# Patient Record
Sex: FEMALE | URBAN - METROPOLITAN AREA
[De-identification: names, ages, dates, MRNs, and addresses within clinical notes are randomized per-mention and may not be internally consistent; named-entity substitution may affect disease eponyms.]

---

## 2024-05-31 ENCOUNTER — OFFICE VISIT (OUTPATIENT)
Dept: OBGYN CLINIC | Age: 24
End: 2024-05-31
Payer: COMMERCIAL

## 2024-05-31 ENCOUNTER — HOSPITAL ENCOUNTER (OUTPATIENT)
Age: 24
Setting detail: SPECIMEN
Discharge: HOME | End: 2024-05-31

## 2024-05-31 VITALS
WEIGHT: 114 LBS | SYSTOLIC BLOOD PRESSURE: 112 MMHG | HEIGHT: 63 IN | BODY MASS INDEX: 20.2 KG/M2 | DIASTOLIC BLOOD PRESSURE: 78 MMHG

## 2024-05-31 DIAGNOSIS — Z11.3 SCREEN FOR STD (SEXUALLY TRANSMITTED DISEASE): ICD-10-CM

## 2024-05-31 DIAGNOSIS — Z01.419 WELL WOMAN EXAM: Primary | ICD-10-CM

## 2024-05-31 DIAGNOSIS — T83.32XA INTRAUTERINE CONTRACEPTIVE DEVICE THREADS LOST, INITIAL ENCOUNTER: ICD-10-CM

## 2024-05-31 DIAGNOSIS — R10.2 PELVIC PAIN: ICD-10-CM

## 2024-05-31 PROCEDURE — 99385 PREV VISIT NEW AGE 18-39: CPT | Performed by: ADVANCED PRACTICE MIDWIFE

## 2024-05-31 RX ORDER — DEXTROAMPHETAMINE/AMPHETAMINE 15 MG
CAPSULE, EXT RELEASE 24 HR ORAL
COMMUNITY

## 2024-05-31 RX ORDER — FLUOXETINE HCL 20 MG
CAPSULE ORAL
COMMUNITY
Start: 2024-04-03

## 2024-05-31 RX ORDER — DEXTROAMPHETAMINE SACCHARATE, AMPHETAMINE ASPARTATE, DEXTROAMPHETAMINE SULFATE, AND AMPHETAMINE SULFATE 1.25; 1.25; 1.25; 1.25 MG/1; MG/1; MG/1; MG/1
TABLET ORAL
COMMUNITY

## 2024-05-31 SDOH — ECONOMIC STABILITY: FOOD INSECURITY: WITHIN THE PAST 12 MONTHS, THE FOOD YOU BOUGHT JUST DIDN'T LAST AND YOU DIDN'T HAVE MONEY TO GET MORE.: NEVER TRUE

## 2024-05-31 SDOH — ECONOMIC STABILITY: HOUSING INSECURITY
IN THE LAST 12 MONTHS, WAS THERE A TIME WHEN YOU DID NOT HAVE A STEADY PLACE TO SLEEP OR SLEPT IN A SHELTER (INCLUDING NOW)?: NO

## 2024-05-31 SDOH — ECONOMIC STABILITY: FOOD INSECURITY: WITHIN THE PAST 12 MONTHS, YOU WORRIED THAT YOUR FOOD WOULD RUN OUT BEFORE YOU GOT MONEY TO BUY MORE.: NEVER TRUE

## 2024-05-31 SDOH — ECONOMIC STABILITY: INCOME INSECURITY: HOW HARD IS IT FOR YOU TO PAY FOR THE VERY BASICS LIKE FOOD, HOUSING, MEDICAL CARE, AND HEATING?: NOT HARD AT ALL

## 2024-05-31 ASSESSMENT — ENCOUNTER SYMPTOMS
NAUSEA: 0
SHORTNESS OF BREATH: 0
DIARRHEA: 0
VOMITING: 0
ABDOMINAL PAIN: 0

## 2024-05-31 ASSESSMENT — PATIENT HEALTH QUESTIONNAIRE - PHQ9: DEPRESSION UNABLE TO ASSESS: PT REFUSES

## 2024-05-31 NOTE — PROGRESS NOTES
Chaperone for Intimate Exam  Chaperone was offered as part of the rooming process. Patient declined and agrees to continue with exam without a chaperone.  Chaperone: n/a      
no  Domestic Violence Screening: negative    STD history: No      Birth control method :Yes    If yes, has a Mirena IUD      Physical Exam  Constitutional:       Appearance: Normal appearance. She is not diaphoretic.   Genitourinary:      No lesions in the vagina.      Right Labia: No rash, tenderness, lesions or skin changes.     Left Labia: No tenderness, lesions, skin changes or rash.     No vaginal erythema, tenderness or ulceration.        Right Adnexa: not tender.     Left Adnexa: not tender.     No cervical motion tenderness or friability.      No IUD strings visualized.   Breasts:     Right: No inverted nipple, mass, nipple discharge, skin change or tenderness.      Left: No inverted nipple, mass, nipple discharge, skin change or tenderness.   Cardiovascular:      Rate and Rhythm: Normal rate and regular rhythm.   Pulmonary:      Effort: Pulmonary effort is normal.   Abdominal:      General: Abdomen is flat. Bowel sounds are normal.   Musculoskeletal:         General: Normal range of motion.      Cervical back: Normal range of motion.   Neurological:      Mental Status: She is alert.   Psychiatric:         Mood and Affect: Mood normal.         Thought Content: Thought content normal.         Judgment: Judgment normal.   Vitals reviewed.           Assessment & Plan  Well Woman Exam  Age 18 and > Well Woman Care  General Health:  [x] Reviewed the importance of routine health screening based on age and risk factors: Yes  Azalea Meyers has a PCP No None, None  If no: PCP referral sent: yes  [x] Alcohol screening & counseling  none  [x] Blood pressure screening: normal   History of Hypertension: no  [x] Contraceptive counseling & methods: well woman has a Mirena IUD   Happy with current method: Yes and NO  [x] Diabetes Screening based on age and risk factors discussed: yes   [x] Folic acid supplementation Reviewed recommended during the childbearing years  [x] Healthful diet & activity counseling:  discussed  [x]

## 2024-06-01 DIAGNOSIS — Z01.419 WELL WOMAN EXAM: ICD-10-CM

## 2024-06-01 DIAGNOSIS — Z11.3 SCREEN FOR STD (SEXUALLY TRANSMITTED DISEASE): ICD-10-CM

## 2024-06-01 LAB
C TRACH DNA SPEC QL PROBE+SIG AMP: NORMAL
N GONORRHOEA DNA SPEC QL PROBE+SIG AMP: NORMAL
SPECIMEN DESCRIPTION: NORMAL

## 2024-06-03 ENCOUNTER — HOSPITAL ENCOUNTER (OUTPATIENT)
Dept: PHYSICAL THERAPY | Facility: CLINIC | Age: 24
Setting detail: THERAPIES SERIES
Discharge: HOME | End: 2024-06-03
Payer: COMMERCIAL

## 2024-06-03 ENCOUNTER — APPOINTMENT (OUTPATIENT)
Dept: ULTRASOUND IMAGING | Age: 24
End: 2024-06-03
Payer: COMMERCIAL

## 2024-06-03 ENCOUNTER — HOSPITAL ENCOUNTER (OUTPATIENT)
Dept: ULTRASOUND IMAGING | Age: 24
Discharge: HOME | End: 2024-06-05
Payer: COMMERCIAL

## 2024-06-03 DIAGNOSIS — T83.32XA INTRAUTERINE CONTRACEPTIVE DEVICE THREADS LOST, INITIAL ENCOUNTER: ICD-10-CM

## 2024-06-03 PROCEDURE — 97161 PT EVAL LOW COMPLEX 20 MIN: CPT

## 2024-06-03 PROCEDURE — 76856 US EXAM PELVIC COMPLETE: CPT

## 2024-06-03 PROCEDURE — 97530 THERAPEUTIC ACTIVITIES: CPT

## 2024-06-03 PROCEDURE — 76830 TRANSVAGINAL US NON-OB: CPT

## 2024-06-03 NOTE — CONSULTS
[] [facility]  Outpatient Rehabilitation &  Therapy  [address]  [phone]  F: [phone] [] [facility]  Outpatient Rehabilitation &  Therapy  [address]   [address]  P: [phone]  F: [phone] [x] [facility]  Outpatient Rehabilitation &  Therapy  [address]  P: [phone]  F: [phone] [] [facility]  Outpatient Rehabilitation &  Therapy  [address]  P: [phone]  F: [phone] [] [facility]  Outpatient Rehabilitation &  Therapy  [address]   P: [phone]  F: [phone]      Physical Therapy General Evaluation/Pelvic Floor     Date:  6/3/2024  Patient: Azalea Meyers  : 2000  MRN: [id number]  Physician: WOLF Lyles    Insurance:  [place] PPO: tracee  -  vs remain, hard max, combined; No auth req'd; OOP met - covered 100%    Medical Diagnosis:   Z01.419 (ICD-10-CM) - Well woman exam   R10.2 (ICD-10-CM) - Pelvic pain     Rehab Codes: R10.2, R27.8, M62.81, M62.08, R39.15, N39.41   Onset Date:  3/20/2022                       Next 's appt: 24    Subjective:   CC/HPI: Patient reports to physical therapy with pelvic pain, urinary urgency and UUI. Patient reports that she delivered vaginally on 3/20/2022 and began to experience pain following. States that she has primarily feels pain with sexual intercourse and when her bladder is full. Pain occurs primarily in her abdominal region and lower back. Describes the pain as sharp, however has a constant feeling of pressure in her lower abdominal region. Patient has also noticed urinary urgency and feels that she is not completely emptying her bladder. Does have urinary urge incontinence as well, which primarily occurs because she has held her bladder for a longer period of time.       Stress Incontinence: [] Yes   [x] No      Stress incontinence occurs with:   Amount

## 2024-06-04 LAB
C TRACH DNA SPEC QL PROBE+SIG AMP: NEGATIVE
N GONORRHOEA DNA SPEC QL PROBE+SIG AMP: NEGATIVE
SPECIMEN DESCRIPTION: NORMAL

## 2024-06-09 LAB — CYTOLOGY REPORT: NORMAL

## 2024-06-12 ENCOUNTER — HOSPITAL ENCOUNTER (OUTPATIENT)
Dept: PHYSICAL THERAPY | Facility: CLINIC | Age: 24
Setting detail: THERAPIES SERIES
Discharge: HOME | End: 2024-06-12
Payer: COMMERCIAL

## 2024-06-12 PROCEDURE — 97140 MANUAL THERAPY 1/> REGIONS: CPT

## 2024-06-12 PROCEDURE — 97110 THERAPEUTIC EXERCISES: CPT

## 2024-06-12 NOTE — FLOWSHEET NOTE
[] [facility]  Outpatient Rehabilitation &  Therapy  [address]  [phone]  [phone] [] [facility]  Outpatient Rehabilitation &  Therapy  [address]  P: [phone]  F: [phone] [x] [facility]  Outpatient Rehabilitation &  Therapy  [address]  P: [phone]  F: [phone] [] [facility]  Outpatient Rehabilitation &  Therapy  [address]  [phone]  [phone] [] [facility]  Outpatient Rehabilitation &  Therapy  [address]   P: [phone]  F: [phone]  [] [facility]  Outpatient Rehabilitation &  Therapy  [address]  P: [phone]  F: [phone] [] [facility]  Outpatient Rehabilitation &  Therapy  [address]  P: [phone]  F: [phone] [] [facility]  Outpatient Rehabilitation &  Therapy  [address]  P: [phone]  F: [phone] [] [facility]  Outpatient Rehabilitation &  Therapy  [address]  P: [phone]  F: [phone]  [] [facility] Outpatient Rehabilitation &  Therapy  [address]  P: [phone]  F: [phone]     Physical Therapy Daily Treatment Note    Date:  2024  Patient Name:  Azalea Meyers    :  2000  MRN: [id number]  Physician: WOLF Lyles                   Insurance:  [place] PPO: tracee  -  60 vs remain, hard max, combined; No auth req'd; OOP met - covered 100%    Medical Diagnosis:   Z01.419 (ICD-10-CM) - Well woman exam   R10.2 (ICD-10-CM) - Pelvic pain      Rehab Codes: R10.2, R27.8, M62.81, M62.08, R39.15, N39.41   Onset Date:  3/20/2022                       Next 's appt: 24  Visit# / total visits: ;     Cancels/No Shows: 0/0    Subjective:    Pain:  [x] Yes  [] No Location: (R) low

## 2024-06-19 ENCOUNTER — HOSPITAL ENCOUNTER (OUTPATIENT)
Dept: PHYSICAL THERAPY | Facility: CLINIC | Age: 24
Setting detail: THERAPIES SERIES
Discharge: HOME | End: 2024-06-19
Payer: COMMERCIAL

## 2024-06-19 PROCEDURE — 97110 THERAPEUTIC EXERCISES: CPT

## 2024-06-19 PROCEDURE — 97140 MANUAL THERAPY 1/> REGIONS: CPT

## 2024-06-19 NOTE — FLOWSHEET NOTE
Figure 4 Piriformis Stretch  - 1 x daily - 7 x weekly - 3 sets - 30 sec hold  - Supine Lower Trunk Rotation  - 1 x daily - 7 x weekly - 1 sets - 10 reps  - Half Kneeling Hip Flexor Stretch  - 1 x daily - 7 x weekly - 3 sets - 30 sec hold  - Supine Bridge  - 2 x daily - 7 x weekly - 2 sets - 10 reps  - Clamshell  - 2 x daily - 7 x weekly - 2 sets - 10 reps  - Sidelying Hip Abduction  - 2 x daily - 7 x weekly - 2 sets - 10 reps  - Supine Hip Adduction Isometric with Ball  - 1 x daily - 7 x weekly - 3 sets - 10 reps - 3 sec hold  - Supine Transversus Abdominis Bracing - Hands on Stomach  - 2 x daily - 7 x weekly - 2 sets - 10 reps - 5 sec hold  Comprehension of Education:  [x] Verbalizes understanding.  [x] Demonstrates understanding.  [x] Needs review.  [] Demonstrates/verbalizes HEP/Ed previously given.     Plan: [x] Continue current frequency toward long and short term goals.          Time In: 3:35 pm            Time Out: 4:21 pm    Electronically signed by:  Porsha Morales, PT

## 2024-06-22 SDOH — HEALTH STABILITY: PHYSICAL HEALTH: ON AVERAGE, HOW MANY DAYS PER WEEK DO YOU ENGAGE IN MODERATE TO STRENUOUS EXERCISE (LIKE A BRISK WALK)?: 7 DAYS

## 2024-06-25 ENCOUNTER — HOSPITAL ENCOUNTER (OUTPATIENT)
Dept: PHYSICAL THERAPY | Facility: CLINIC | Age: 24
Setting detail: THERAPIES SERIES
Discharge: HOME | End: 2024-06-25
Payer: COMMERCIAL

## 2024-06-25 ENCOUNTER — OFFICE VISIT (OUTPATIENT)
Dept: FAMILY MEDICINE CLINIC | Age: 24
End: 2024-06-25
Payer: COMMERCIAL

## 2024-06-25 VITALS
WEIGHT: 112 LBS | OXYGEN SATURATION: 99 % | SYSTOLIC BLOOD PRESSURE: 108 MMHG | DIASTOLIC BLOOD PRESSURE: 70 MMHG | HEIGHT: 65 IN | BODY MASS INDEX: 18.66 KG/M2 | HEART RATE: 81 BPM

## 2024-06-25 DIAGNOSIS — F90.9 ATTENTION DEFICIT HYPERACTIVITY DISORDER (ADHD), UNSPECIFIED ADHD TYPE: ICD-10-CM

## 2024-06-25 DIAGNOSIS — K58.2 IRRITABLE BOWEL SYNDROME WITH BOTH CONSTIPATION AND DIARRHEA: ICD-10-CM

## 2024-06-25 DIAGNOSIS — F32.A ANXIETY AND DEPRESSION: ICD-10-CM

## 2024-06-25 DIAGNOSIS — F41.9 ANXIETY AND DEPRESSION: ICD-10-CM

## 2024-06-25 DIAGNOSIS — Z00.00 PREVENTATIVE HEALTH CARE: ICD-10-CM

## 2024-06-25 DIAGNOSIS — Z76.89 ENCOUNTER TO ESTABLISH CARE: ICD-10-CM

## 2024-06-25 DIAGNOSIS — M79.10 MYALGIA: ICD-10-CM

## 2024-06-25 DIAGNOSIS — M25.50 ARTHRALGIA, UNSPECIFIED JOINT: Primary | ICD-10-CM

## 2024-06-25 DIAGNOSIS — R63.4 WEIGHT LOSS: ICD-10-CM

## 2024-06-25 DIAGNOSIS — R53.83 FATIGUE, UNSPECIFIED TYPE: ICD-10-CM

## 2024-06-25 PROCEDURE — 97110 THERAPEUTIC EXERCISES: CPT

## 2024-06-25 PROCEDURE — 99204 OFFICE O/P NEW MOD 45 MIN: CPT | Performed by: NURSE PRACTITIONER

## 2024-06-25 ASSESSMENT — ENCOUNTER SYMPTOMS
BACK PAIN: 0
CHEST TIGHTNESS: 0
COUGH: 0
ABDOMINAL PAIN: 0
CONSTIPATION: 0
RHINORRHEA: 0
SHORTNESS OF BREATH: 0
ABDOMINAL DISTENTION: 0
DIARRHEA: 0
SORE THROAT: 0
VOMITING: 0
NAUSEA: 0

## 2024-06-25 ASSESSMENT — PATIENT HEALTH QUESTIONNAIRE - PHQ9
8. MOVING OR SPEAKING SO SLOWLY THAT OTHER PEOPLE COULD HAVE NOTICED. OR THE OPPOSITE, BEING SO FIGETY OR RESTLESS THAT YOU HAVE BEEN MOVING AROUND A LOT MORE THAN USUAL: NOT AT ALL
6. FEELING BAD ABOUT YOURSELF - OR THAT YOU ARE A FAILURE OR HAVE LET YOURSELF OR YOUR FAMILY DOWN: NEARLY EVERY DAY
SUM OF ALL RESPONSES TO PHQ QUESTIONS 1-9: 19
7. TROUBLE CONCENTRATING ON THINGS, SUCH AS READING THE NEWSPAPER OR WATCHING TELEVISION: NEARLY EVERY DAY
3. TROUBLE FALLING OR STAYING ASLEEP: NEARLY EVERY DAY
SUM OF ALL RESPONSES TO PHQ QUESTIONS 1-9: 19
5. POOR APPETITE OR OVEREATING: NEARLY EVERY DAY
2. FEELING DOWN, DEPRESSED OR HOPELESS: MORE THAN HALF THE DAYS
4. FEELING TIRED OR HAVING LITTLE ENERGY: NEARLY EVERY DAY
SUM OF ALL RESPONSES TO PHQ9 QUESTIONS 1 & 2: 4
10. IF YOU CHECKED OFF ANY PROBLEMS, HOW DIFFICULT HAVE THESE PROBLEMS MADE IT FOR YOU TO DO YOUR WORK, TAKE CARE OF THINGS AT HOME, OR GET ALONG WITH OTHER PEOPLE: VERY DIFFICULT
1. LITTLE INTEREST OR PLEASURE IN DOING THINGS: MORE THAN HALF THE DAYS
SUM OF ALL RESPONSES TO PHQ QUESTIONS 1-9: 19
9. THOUGHTS THAT YOU WOULD BE BETTER OFF DEAD, OR OF HURTING YOURSELF: NOT AT ALL
SUM OF ALL RESPONSES TO PHQ QUESTIONS 1-9: 19

## 2024-06-25 NOTE — FLOWSHEET NOTE
hold  - Quick Flick Pelvic Floor Contractions in Hooklying  - 1 x daily - 7 x weekly - 1 sets - 15 reps  - Pelvic Floor Contractions in Hooklying with Adduction  - 1 x daily - 7 x weekly - 1 sets - 15 reps - 3 sec hold  - Bent Knee Fallouts  - 1 x daily - 7 x weekly - 2 sets - 10 reps  - Supine March  - 1 x daily - 7 x weekly - 2 sets - 10 reps  - Hooklying Heel Walk  - 1 x daily - 7 x weekly - 1 sets - 10 reps  - Active Straight Leg Raise Advanced  - 1 x daily - 7 x weekly - 1 sets - 10 reps  - Standing Hip Flexion with Resistance Loop  - 1 x daily - 7 x weekly - 1 sets - 10 reps  - Hip Abduction with Resistance Loop  - 1 x daily - 7 x weekly - 1 sets - 10 reps  - Hip Extension with Resistance Loop  - 1 x daily - 7 x weekly - 1 sets - 10 reps  Comprehension of Education:  [x] Verbalizes understanding.  [x] Demonstrates understanding.  [x] Needs review.  [] Demonstrates/verbalizes HEP/Ed previously given.     Plan: [x] Continue current frequency toward long and short term goals.          Time In: 3:10 pm            Time Out: 4:00 pm    Electronically signed by:  Porsha Morales PT

## 2024-06-25 NOTE — PROGRESS NOTES
sounds: Normal heart sounds. No murmur heard.  Pulmonary:      Effort: Pulmonary effort is normal. No respiratory distress.      Breath sounds: Normal breath sounds.   Chest:      Chest wall: No tenderness.   Abdominal:      General: Bowel sounds are normal.      Palpations: Abdomen is soft.      Tenderness: There is no abdominal tenderness.   Musculoskeletal:         General: Normal range of motion.      Cervical back: Normal range of motion.      Right lower leg: No edema.      Left lower leg: No edema.   Skin:     General: Skin is warm and dry.      Findings: No rash.   Neurological:      Mental Status: She is alert and oriented to person, place, and time.   Psychiatric:         Mood and Affect: Mood normal.         Behavior: Behavior is cooperative.       ASSESSMENT:   Diagnosis Orders   1. Arthralgia, unspecified joint  STEF Screen with Reflex    Cyclic Citrul Peptide Antibody, IgG    Rheumatoid Factor    Sedimentation Rate      2. Encounter to establish care        3. Preventative health care  CBC    Comprehensive Metabolic Panel    Hemoglobin A1C    Lipid Panel    TSH with Reflex      4. Myalgia  STEF Screen with Reflex    Cyclic Citrul Peptide Antibody, IgG    Rheumatoid Factor    Sedimentation Rate      5. Fatigue, unspecified type  Iron and TIBC    Vitamin B12 & Folate    Celiac Disease Panel    STEF Screen with Reflex    Cyclic Citrul Peptide Antibody, IgG    Rheumatoid Factor    Sedimentation Rate      6. Weight loss        7. Irritable bowel syndrome with both constipation and diarrhea  IBD Panel    Celiac Disease Panel      8. Anxiety and depression        9. Attention deficit hyperactivity disorder (ADHD), unspecified ADHD type           PLAN:  1. Encounter to establish care  - Previous patient of Dr. Cydney Arellano, pediatrician    - Last documented encounter was 10/2022      2. Preventative health care  - We did discuss in detail the and the recommended preventative screening guidelines (HTN, HLD, DM,

## 2024-07-02 ENCOUNTER — HOSPITAL ENCOUNTER (OUTPATIENT)
Dept: PHYSICAL THERAPY | Facility: CLINIC | Age: 24
Setting detail: THERAPIES SERIES
Discharge: HOME | End: 2024-07-02
Payer: COMMERCIAL

## 2024-07-02 PROCEDURE — 97110 THERAPEUTIC EXERCISES: CPT

## 2024-07-02 NOTE — FLOWSHEET NOTE
[] [facility]  Outpatient Rehabilitation &  Therapy  [address]  [phone]  [phone] [] [facility]  Outpatient Rehabilitation &  Therapy  [address]  P: [phone]  F: [phone] [x] [facility]  Outpatient Rehabilitation &  Therapy  [address]  P: [phone]  F: [phone] [] [facility]  Outpatient Rehabilitation &  Therapy  [address]  [phone]  [phone] [] [facility]  Outpatient Rehabilitation &  Therapy  [address]   P: [phone]  F: [phone]  [] [facility]  Outpatient Rehabilitation &  Therapy  [address]  P: [phone]  F: [phone] [] [facility]  Outpatient Rehabilitation &  Therapy  [address]  P: [phone]  F: [phone] [] [facility]  Outpatient Rehabilitation &  Therapy  [address]  P: [phone]  F: [phone] [] [facility]  Outpatient Rehabilitation &  Therapy  [address]  P: [phone]  F: [phone]  [] [facility] Outpatient Rehabilitation &  Therapy  [address]  P: [phone]  F: [phone]     Physical Therapy Daily Treatment Note    Date:  2024  Patient Name:  Azalea Meyers    :  2000  MRN: [id number]  Physician: WOLF Lyles                   Insurance:  [place] PPO: tracee  -  60 vs remain, hard max, combined; No auth req'd; OOP met - covered 100%    Medical Diagnosis:   Z01.419 (ICD-10-CM) - Well woman exam   R10.2 (ICD-10-CM) - Pelvic pain      Rehab Codes: R10.2, R27.8, M62.81, M62.08, R39.15, N39.41   Onset Date:  3/20/2022                       Next 's appt: 24  Visit# / total visits: ;     Cancels/No Shows: 0/0    Subjective:    Pain:  [x] Yes  [] No Location: (R) low

## 2024-07-09 ENCOUNTER — HOSPITAL ENCOUNTER (OUTPATIENT)
Dept: PHYSICAL THERAPY | Facility: CLINIC | Age: 24
Setting detail: THERAPIES SERIES
Discharge: HOME | End: 2024-07-09
Payer: COMMERCIAL

## 2024-07-09 PROCEDURE — 97110 THERAPEUTIC EXERCISES: CPT

## 2024-07-09 NOTE — FLOWSHEET NOTE
[] [facility]  Outpatient Rehabilitation &  Therapy  [address]  [phone]  [phone] [] [facility]  Outpatient Rehabilitation &  Therapy  [address]  P: [phone]  F: [phone] [x] [facility]  Outpatient Rehabilitation &  Therapy  [address]  P: [phone]  F: [phone] [] [facility]  Outpatient Rehabilitation &  Therapy  [address]  [phone]  [phone] [] [facility]  Outpatient Rehabilitation &  Therapy  [address]   P: [phone]  F: [phone]  [] [facility]  Outpatient Rehabilitation &  Therapy  [address]  P: [phone]  F: [phone] [] [facility]  Outpatient Rehabilitation &  Therapy  [address]  P: [phone]  F: [phone] [] [facility]  Outpatient Rehabilitation &  Therapy  [address]  P: [phone]  F: [phone] [] [facility]  Outpatient Rehabilitation &  Therapy  [address]  P: [phone]  F: [phone]  [] [facility] Outpatient Rehabilitation &  Therapy  [address]  P: [phone]  F: [phone]     Physical Therapy Daily Treatment Note    Date:  2024  Patient Name:  Azalea Meyers    :  2000  MRN: [id number]  Physician: WOLF Lyles                   Insurance:  [place] PPO: tracee  -  60 vs remain, hard max, combined; No auth req'd; OOP met - covered 100%    Medical Diagnosis:   Z01.419 (ICD-10-CM) - Well woman exam   R10.2 (ICD-10-CM) - Pelvic pain      Rehab Codes: R10.2, R27.8, M62.81, M62.08, R39.15, N39.41   Onset Date:  3/20/2022                       Next 's appt: 24  Visit# / total visits: ;     Cancels/No Shows: 0/0    Subjective:    Pain:  [x] Yes  [] No Location: (R) low

## 2024-07-16 ENCOUNTER — HOSPITAL ENCOUNTER (OUTPATIENT)
Dept: PHYSICAL THERAPY | Facility: CLINIC | Age: 24
Setting detail: THERAPIES SERIES
Discharge: HOME | End: 2024-07-16
Payer: COMMERCIAL

## 2024-07-16 PROCEDURE — 97110 THERAPEUTIC EXERCISES: CPT

## 2024-07-16 PROCEDURE — 97140 MANUAL THERAPY 1/> REGIONS: CPT

## 2024-07-16 NOTE — FLOWSHEET NOTE
[] [facility]  Outpatient Rehabilitation &  Therapy  [address]  [phone]  [phone] [] [facility]  Outpatient Rehabilitation &  Therapy  [address]  P: [phone]  F: [phone] [x] [facility]  Outpatient Rehabilitation &  Therapy  [address]  P: [phone]  F: [phone] [] [facility]  Outpatient Rehabilitation &  Therapy  [address]  [phone]  [phone] [] [facility]  Outpatient Rehabilitation &  Therapy  [address]   P: [phone]  F: [phone]  [] [facility]  Outpatient Rehabilitation &  Therapy  [address]  P: [phone]  F: [phone] [] [facility]  Outpatient Rehabilitation &  Therapy  [address]  P: [phone]  F: [phone] [] [facility]  Outpatient Rehabilitation &  Therapy  [address]  P: [phone]  F: [phone] [] [facility]  Outpatient Rehabilitation &  Therapy  [address]  P: [phone]  F: [phone]  [] [facility] Outpatient Rehabilitation &  Therapy  [address]  P: [phone]  F: [phone]     Physical Therapy Daily Treatment Note    Date:  2024  Patient Name:  Azalea Meyers    :  2000  MRN: [id number]  Physician: WOLF Lyles                   Insurance:  [place] PPO: tracee  -  60 vs remain, hard max, combined; No auth req'd; OOP met - covered 100%    Medical Diagnosis:   Z01.419 (ICD-10-CM) - Well woman exam   R10.2 (ICD-10-CM) - Pelvic pain      Rehab Codes: R10.2, R27.8, M62.81, M62.08, R39.15, N39.41   Onset Date:  3/20/2022                       Next 's appt: 24  Visit# / total visits: ;     Cancels/No Shows: 0/0    Subjective:    Pain:  [x] Yes  [] No Location: (R) low

## 2024-07-23 ENCOUNTER — HOSPITAL ENCOUNTER (OUTPATIENT)
Dept: PHYSICAL THERAPY | Facility: CLINIC | Age: 24
Setting detail: THERAPIES SERIES
Discharge: HOME | End: 2024-07-23
Payer: COMMERCIAL

## 2024-07-23 PROCEDURE — 97140 MANUAL THERAPY 1/> REGIONS: CPT

## 2024-07-23 PROCEDURE — 97110 THERAPEUTIC EXERCISES: CPT

## 2024-07-23 NOTE — FLOWSHEET NOTE
[] [facility]  Outpatient Rehabilitation &  Therapy  [address]  [phone]  [phone] [] [facility]  Outpatient Rehabilitation &  Therapy  [address]  P: [phone]  F: [phone] [x] [facility]  Outpatient Rehabilitation &  Therapy  [address]  P: [phone]  F: [phone] [] [facility]  Outpatient Rehabilitation &  Therapy  [address]  [phone]  [phone] [] [facility]  Outpatient Rehabilitation &  Therapy  [address]   P: [phone]  F: [phone]  [] [facility]  Outpatient Rehabilitation &  Therapy  [address]  P: [phone]  F: [phone] [] [facility]  Outpatient Rehabilitation &  Therapy  [address]  P: [phone]  F: [phone] [] [facility]  Outpatient Rehabilitation &  Therapy  [address]  P: [phone]  F: [phone] [] [facility]  Outpatient Rehabilitation &  Therapy  [address]  P: [phone]  F: [phone]  [] [facility] Outpatient Rehabilitation &  Therapy  [address]  P: [phone]  F: [phone]     Physical Therapy Daily Treatment Note    Date:  2024  Patient Name:  Azalea Meyers    :  2000  MRN: [id number]  Physician: WOLF Lyles                   Insurance:  [place] PPO: tracee  -  60 vs remain, hard max, combined; No auth req'd; OOP met - covered 100%    Medical Diagnosis:   Z01.419 (ICD-10-CM) - Well woman exam   R10.2 (ICD-10-CM) - Pelvic pain      Rehab Codes: R10.2, R27.8, M62.81, M62.08, R39.15, N39.41   Onset Date:  3/20/2022                       Next 's appt: 24  Visit# / total visits: ;     Cancels/No Shows: 0/0    Subjective:    Pain:  [x] Yes  [] No Location: (L)  Pain

## 2024-07-30 ENCOUNTER — HOSPITAL ENCOUNTER (OUTPATIENT)
Dept: PHYSICAL THERAPY | Facility: CLINIC | Age: 24
Setting detail: THERAPIES SERIES
Discharge: HOME | End: 2024-07-30
Payer: COMMERCIAL

## 2024-07-30 PROCEDURE — 97140 MANUAL THERAPY 1/> REGIONS: CPT

## 2024-07-30 PROCEDURE — 97110 THERAPEUTIC EXERCISES: CPT

## 2024-07-30 NOTE — FLOWSHEET NOTE
with good tolerance. Increased resistance of TB rows and shoulder extension with patient noting more difficulty, however able to tolerate. Initiated TB ER/IR in order to progress scapular stability. Resumed mat hip strengthening ex in order to progress stability to low back and pelvis. Ended session with IASTM to (L) lumbar paraspinals and (L) piriformis in order to reduce pain and discomfort. Patient reports reduced pain following session this date.     [] No change.     [] Other:  [x] Patient would continue to benefit from skilled physical therapy services in order to: improve pain tolerance,diastasis recti, pelvic floor relaxation, pelvic floor coordination and (B) LE strength.     STG: (to be met in 6 treatments)  Patient will report pain as 2/10  ? Function: Patient will demonstrate 1 cm perineal body descent without use of accessory muscles in order to improve relaxation and coordination of the pelvic floor  Patient will demonstrate proper toileting posture and breathing technique in order to reduce constipation   Patient will report reduced UUI symptoms with use of urge suppression techniques   Independent with Home Exercise Programs  LTG: (to be met in 12 treatments)   Patient will improve PPIQ to <10 points in order to indicate improved overall quality of life   Patient will improve diastasis recti to 0 finger widths in order to reduce tension on low back and decrease low back pain   Patient will demonstrate ability to lift up to 25lbs with good core/pelvic floor stability and good breathing technique in order to increase ease of performing care taking tasks   Patient will improve (B) LE strength to 5/5 in order to increase stability to pelvis   Patient will report pain as 0/10    Pt. Education:  [x] Yes  [] No  [x] Reviewed Prior HEP/Ed  Method of Education: [x] Verbal  [x] Demo  [] Written  Access Code: [email or web address]  URL: [email or web address]  Date: 07/02/2024  Prepared by: Porsha

## 2024-07-31 ENCOUNTER — HOSPITAL ENCOUNTER (OUTPATIENT)
Age: 24
Setting detail: SPECIMEN
Discharge: HOME | End: 2024-07-31

## 2024-07-31 DIAGNOSIS — K58.2 IRRITABLE BOWEL SYNDROME WITH BOTH CONSTIPATION AND DIARRHEA: ICD-10-CM

## 2024-07-31 DIAGNOSIS — R53.83 FATIGUE, UNSPECIFIED TYPE: ICD-10-CM

## 2024-07-31 DIAGNOSIS — M25.50 ARTHRALGIA, UNSPECIFIED JOINT: ICD-10-CM

## 2024-07-31 DIAGNOSIS — Z00.00 PREVENTATIVE HEALTH CARE: ICD-10-CM

## 2024-07-31 DIAGNOSIS — M79.10 MYALGIA: ICD-10-CM

## 2024-07-31 LAB
ALBUMIN SERPL-MCNC: 4.4 G/DL (ref 3.5–5.2)
ALBUMIN/GLOB SERPL: 2 {RATIO} (ref 1–2.5)
ALP SERPL-CCNC: 61 U/L (ref 35–104)
ALT SERPL-CCNC: 15 U/L (ref 10–35)
ANION GAP SERPL CALCULATED.3IONS-SCNC: 13 MMOL/L (ref 9–16)
AST SERPL-CCNC: 20 U/L (ref 10–35)
BILIRUB SERPL-MCNC: 0.3 MG/DL (ref 0–1.2)
BUN SERPL-MCNC: 14 MG/DL (ref 6–20)
CALCIUM SERPL-MCNC: 9.1 MG/DL (ref 8.6–10.4)
CHLORIDE SERPL-SCNC: 103 MMOL/L (ref 98–107)
CHOLEST SERPL-MCNC: 163 MG/DL (ref 0–199)
CHOLESTEROL/HDL RATIO: 3
CO2 SERPL-SCNC: 22 MMOL/L (ref 20–31)
CREAT SERPL-MCNC: 0.8 MG/DL (ref 0.5–0.9)
ERYTHROCYTE [DISTWIDTH] IN BLOOD BY AUTOMATED COUNT: 12.3 % (ref 11.8–14.4)
ERYTHROCYTE [SEDIMENTATION RATE] IN BLOOD BY PHOTOMETRIC METHOD: 1 MM/HR (ref 0–20)
EST. AVERAGE GLUCOSE BLD GHB EST-MCNC: 77 MG/DL
FOLATE SERPL-MCNC: 10.3 NG/ML (ref 4.8–24.2)
GFR, ESTIMATED: >90 ML/MIN/1.73M2
GLUCOSE SERPL-MCNC: 67 MG/DL (ref 74–99)
HBA1C MFR BLD: 4.3 % (ref 4–6)
HCT VFR BLD AUTO: 40.5 % (ref 36.3–47.1)
HDLC SERPL-MCNC: 51 MG/DL
HGB BLD-MCNC: 13.6 G/DL (ref 11.9–15.1)
IRON SATN MFR SERPL: 35 % (ref 20–55)
IRON SERPL-MCNC: 93 UG/DL (ref 37–145)
LDLC SERPL CALC-MCNC: 94 MG/DL (ref 0–100)
MCH RBC QN AUTO: 31.8 PG (ref 25.2–33.5)
MCHC RBC AUTO-ENTMCNC: 33.6 G/DL (ref 28.4–34.8)
MCV RBC AUTO: 94.6 FL (ref 82.6–102.9)
NRBC BLD-RTO: 0 PER 100 WBC
PLATELET # BLD AUTO: 257 K/UL (ref 138–453)
PMV BLD AUTO: 11.9 FL (ref 8.1–13.5)
POTASSIUM SERPL-SCNC: 4.3 MMOL/L (ref 3.7–5.3)
PROT SERPL-MCNC: 6.7 G/DL (ref 6.6–8.7)
RBC # BLD AUTO: 4.28 M/UL (ref 3.95–5.11)
RHEUMATOID FACT SER NEPH-ACNC: <10 IU/ML (ref 0–13)
SODIUM SERPL-SCNC: 138 MMOL/L (ref 136–145)
TIBC SERPL-MCNC: 269 UG/DL (ref 250–450)
TRIGL SERPL-MCNC: 91 MG/DL
TSH SERPL DL<=0.05 MIU/L-ACNC: 2.69 UIU/ML (ref 0.27–4.2)
UNSATURATED IRON BINDING CAPACITY: 176 UG/DL (ref 112–347)
VIT B12 SERPL-MCNC: 881 PG/ML (ref 232–1245)
VLDLC SERPL CALC-MCNC: 18 MG/DL
WBC OTHER # BLD: 6.8 K/UL (ref 3.5–11.3)

## 2024-08-02 LAB
ANA SER QL IA: NEGATIVE
CCP AB SER IA-ACNC: 1 U/ML (ref 0–7)
DSDNA IGG SER QL IA: 1.3 IU/ML
GLIADIN IGA SER IA-ACNC: 1.7 U/ML
GLIADIN IGG SER IA-ACNC: 0.9 U/ML
IGA SERPL-MCNC: 335 MG/DL (ref 70–400)
NUCLEAR IGG SER IA-RTO: 0.2 U/ML
TTG IGA SER IA-ACNC: 1.1 U/ML

## 2024-08-06 ENCOUNTER — OFFICE VISIT (OUTPATIENT)
Dept: FAMILY MEDICINE CLINIC | Age: 24
End: 2024-08-06
Payer: COMMERCIAL

## 2024-08-06 VITALS
BODY MASS INDEX: 18.59 KG/M2 | DIASTOLIC BLOOD PRESSURE: 74 MMHG | SYSTOLIC BLOOD PRESSURE: 102 MMHG | OXYGEN SATURATION: 100 % | WEIGHT: 110 LBS | HEART RATE: 77 BPM

## 2024-08-06 DIAGNOSIS — Z00.00 PREVENTATIVE HEALTH CARE: Primary | ICD-10-CM

## 2024-08-06 DIAGNOSIS — G57.10: ICD-10-CM

## 2024-08-06 DIAGNOSIS — F41.9 ANXIETY AND DEPRESSION: ICD-10-CM

## 2024-08-06 DIAGNOSIS — Z00.00 ANNUAL PHYSICAL EXAM: ICD-10-CM

## 2024-08-06 DIAGNOSIS — F90.9 ATTENTION DEFICIT HYPERACTIVITY DISORDER (ADHD), UNSPECIFIED ADHD TYPE: ICD-10-CM

## 2024-08-06 DIAGNOSIS — F32.A ANXIETY AND DEPRESSION: ICD-10-CM

## 2024-08-06 PROCEDURE — 99395 PREV VISIT EST AGE 18-39: CPT | Performed by: NURSE PRACTITIONER

## 2024-08-06 RX ORDER — PREDNISONE 20 MG/1
TABLET ORAL
Qty: 17 TABLET | Refills: 0 | Status: SHIPPED | OUTPATIENT
Start: 2024-08-06 | End: 2024-08-16

## 2024-08-06 ASSESSMENT — ENCOUNTER SYMPTOMS
COUGH: 0
CONSTIPATION: 0
ABDOMINAL DISTENTION: 0
RHINORRHEA: 0
CHEST TIGHTNESS: 0
ABDOMINAL PAIN: 0
SHORTNESS OF BREATH: 0
NAUSEA: 0
SORE THROAT: 0
BACK PAIN: 0
VOMITING: 0
DIARRHEA: 0

## 2024-08-06 NOTE — PROGRESS NOTES
Osiris Kee, APRN-CNP  [facility]  [facility] MEDICINE  [address], [address]  [facility] [address]  Dept: [phone]  Dept Fax: [phone]    Patient ID: Azalea Meyers is a 24 y.o. female.    HPI: Established presents today for annual physical exam. Today, patient complains of pain, numbness and tingling to the outer aspects of her hips and thighs. She relates that she did go to physical therapy and has also been doing stretches at home.  She relates that there are times when she has to put her child down because of the pain. Pt denies any fever or chills.  Pt today denies any HA, chest pain, or SOB.  Pt denies any N/V/D/C or abdominal pain.  Otherwise pt doing well on current tx and voices no other concerns today.      Previous office notes, labs and diagnostic studies were reviewed prior to and during encounter.  The patient's past medical, surgical, social, and family history as well as her current medications and allergies were reviewed as documented in today's encounter by AURY Mejia.      Current Outpatient Medications on File Prior to Visit   Medication Sig Dispense Refill    ADDERALL XR 15 MG capsule Take 1 capsule by mouth. Once daily      PROZAC 20 MG capsule Take 1 capsule by mouth daily      ADDERALL, 5MG, 5 MG tablet Take 1 tablet by mouth daily.       No current facility-administered medications on file prior to visit.     SUBJECTIVE:      Review of Systems   Constitutional:  Negative for activity change, fatigue, fever and unexpected weight change.   HENT:  Negative for congestion, ear pain, rhinorrhea and sore throat.    Respiratory:  Negative for cough, chest tightness and shortness of breath.    Cardiovascular:  Negative for chest pain and palpitations.   Gastrointestinal:  Negative for abdominal distention, abdominal pain, constipation, diarrhea, nausea and vomiting.   Endocrine: Negative for polydipsia, polyphagia and polyuria.

## 2024-08-07 ENCOUNTER — HOSPITAL ENCOUNTER (OUTPATIENT)
Dept: PHYSICAL THERAPY | Facility: CLINIC | Age: 24
Setting detail: THERAPIES SERIES
Discharge: HOME | End: 2024-08-07
Payer: COMMERCIAL

## 2024-08-07 PROCEDURE — 97110 THERAPEUTIC EXERCISES: CPT

## 2024-08-07 PROCEDURE — 97140 MANUAL THERAPY 1/> REGIONS: CPT

## 2024-08-07 NOTE — FLOWSHEET NOTE
paraspinals   Pain Rating: (0-10 scale) 3-4/10  Pain altered Tx:  [x] No  [] Yes  Action:  Comments: Patient reports stating that she feels like she has been progressing but is having an inc in stiffness/tightness today. Reports she wouldn't necessarily describe it as pain but more so annoying tension. States she has focused on stretching the last two days rather than strengthening. Reports having to pick her  [place] from the airport today being in the car 3ish hours which has made her sx worse. Pt reports a dec in sx following manual last tx. States she had an appointment with PCP yesterday who prescribed her a medication for nerve pain which she will be starting soon later this week.     Objective:  Modalities:   Precautions:  Exercises:  Exercise Reps/ Time Weight/ Level Comments    Diaphragmatic breathing   x15        Butterfly S 30\"x3      Happy baby S  30\"x3        Piriformis S  30\"x3        Figure 4 S 30\"x3      1/2 kneeling Hip flexor S  30\"x3        LTR  5\"x10      x   Thread the needle x20   x   Quadruped thoracic rot x20   x   Supine Lying on Foam Roller 3'   x   Foam roller scissors  x20   x   Foam roller horizontal abduction  x20   x   Bar lat S 30\"x3             Bridges 2x15 lime   x   Clamshells 2x15 lime   x   SL hip abduction 2x15 lime   x   Hip adduction ball squeeze 2x10      SLR 2x15   x              TA set   3\"x15         TA set + bent knee fallout   x20        TA set + marches  x20      TA set + walk out  x10      TA set + SLR x10ea             Quadruped TA set  3\"x15      Quadruped Alt UE x20             SB marches x20      SB LAQ x20      SB alt UE/LE x20                    3-way hip x15 orange     Palloff press x20 green     TB rows x20 blue  x   TB shoulder extension x20 blue  x   TB shoulder ER x20 blue Inc resist 8/7 x   TB shoulder IR x20 blue Inc resist 8/7 x   Other:      Treatment Charges: Mins Units   [x]  Modalities- MHP 10 -   [x]  Ther Exercise 42 3   [x]  Manual Therapy 12 1

## 2024-08-09 DIAGNOSIS — K52.9 IBD (INFLAMMATORY BOWEL DISEASE): Primary | ICD-10-CM

## 2024-08-09 LAB — IBD PANEL: NORMAL

## 2024-08-12 ENCOUNTER — TELEPHONE (OUTPATIENT)
Dept: GASTROENTEROLOGY | Age: 24
End: 2024-08-12

## 2024-08-13 ENCOUNTER — HOSPITAL ENCOUNTER (OUTPATIENT)
Dept: PHYSICAL THERAPY | Facility: CLINIC | Age: 24
Setting detail: THERAPIES SERIES
Discharge: HOME | End: 2024-08-13
Payer: COMMERCIAL

## 2024-08-13 PROCEDURE — 97110 THERAPEUTIC EXERCISES: CPT

## 2024-08-13 NOTE — FLOWSHEET NOTE
[] [facility]  Outpatient Rehabilitation &  Therapy  [address]  [phone]  [phone] [] [facility]  Outpatient Rehabilitation &  Therapy  [address]  P: [phone]  F: [phone] [x] [facility]  Outpatient Rehabilitation &  Therapy  [address]  P: [phone]  F: [phone] [] [facility]  Outpatient Rehabilitation &  Therapy  [address]  [phone]  [phone] [] [facility]  Outpatient Rehabilitation &  Therapy  [address]   P: [phone]  F: [phone]  [] [facility]  Outpatient Rehabilitation &  Therapy  [address]  P: [phone]  F: [phone] [] [facility]  Outpatient Rehabilitation &  Therapy  [address]  P: [phone]  F: [phone] [] [facility]  Outpatient Rehabilitation &  Therapy  [address]  P: [phone]  F: [phone] [] [facility]  Outpatient Rehabilitation &  Therapy  [address]  P: [phone]  F: [phone]  [] [facility] Outpatient Rehabilitation &  Therapy  [address]  P: [phone]  F: [phone]     Physical Therapy Daily Treatment Note    Date:  2024  Patient Name:  Azalea Meyers    :  2000  MRN: [id number]  Physician: WOLF Lyles                   Insurance:  [place] PPO: tracee  -  60 vs remain, hard max, combined; No auth req'd; OOP met - covered 100%    Medical Diagnosis:   Z01.419 (ICD-10-CM) - Well woman exam   R10.2 (ICD-10-CM) - Pelvic pain      Rehab Codes: R10.2, R27.8, M62.81, M62.08, R39.15, N39.41   Onset Date:  3/20/2022                       Next 's appt: 24  Visit# / total visits: ;     Cancels/No Shows: 0/0    Subjective:    Pain:  [x] Yes  [] No Location: (L) lumbar

## 2024-08-23 ENCOUNTER — HOSPITAL ENCOUNTER (OUTPATIENT)
Dept: PHYSICAL THERAPY | Facility: CLINIC | Age: 24
Setting detail: THERAPIES SERIES
Discharge: HOME | End: 2024-08-23
Payer: COMMERCIAL

## 2024-08-23 PROCEDURE — 97140 MANUAL THERAPY 1/> REGIONS: CPT

## 2024-08-23 PROCEDURE — 97530 THERAPEUTIC ACTIVITIES: CPT

## 2024-08-23 NOTE — PROGRESS NOTES
[] [facility]  Outpatient Rehabilitation &  Therapy  [address]  [phone]  [phone] [] [facility]  Outpatient Rehabilitation &  Therapy  [address]  P: [phone]  F: [phone] [x] [facility]  Outpatient Rehabilitation &  Therapy  [address]  P: [phone]  F: [phone] [] [facility]  Outpatient Rehabilitation &  Therapy  [address]  [phone]  [phone] [] [facility]  Outpatient Rehabilitation &  Therapy  [address]   P: [phone]  F: [phone]  [] [facility]  Outpatient Rehabilitation &  Therapy  [address]  P: [phone]  F: [phone] [] [facility]  Outpatient Rehabilitation &  Therapy  [address]  P: [phone]  F: [phone] [] [facility]  Outpatient Rehabilitation &  Therapy  [address]  P: [phone]  F: [phone] [] [facility]  Outpatient Rehabilitation &  Therapy  [address]  P: [phone]  F: [phone]  [] [facility] Outpatient Rehabilitation &  Therapy  [address]  P: [phone]  F: [phone]     Physical Therapy Daily Treatment Note    Date:  2024  Patient Name:  Azalea Meyers    :  2000  MRN: [id number]  Physician: WOLF Lyles                   Insurance:  [place] PPO: tracee  -  60 vs remain, hard max, combined; No auth req'd; OOP met - covered 100%    Medical Diagnosis:   Z01.419 (ICD-10-CM) - Well woman exam   R10.2 (ICD-10-CM) - Pelvic pain      Rehab Codes: R10.2, R27.8, M62.81, M62.08, R39.15, N39.41   Onset Date:  3/20/2022                       Next 's appt: 24  Visit# / total visits: ;     Cancels/No Shows: 0/0    Subjective:    Pain:  [x] Yes  [] No Location: (L) lumbar

## 2024-08-27 ENCOUNTER — APPOINTMENT (OUTPATIENT)
Dept: PHYSICAL THERAPY | Facility: CLINIC | Age: 24
End: 2024-08-27
Payer: COMMERCIAL

## 2024-08-30 ENCOUNTER — HOSPITAL ENCOUNTER (OUTPATIENT)
Dept: PHYSICAL THERAPY | Facility: CLINIC | Age: 24
Setting detail: THERAPIES SERIES
Discharge: HOME | End: 2024-08-30
Payer: COMMERCIAL

## 2024-08-30 PROCEDURE — 97110 THERAPEUTIC EXERCISES: CPT

## 2024-08-30 PROCEDURE — 97140 MANUAL THERAPY 1/> REGIONS: CPT

## 2024-08-30 NOTE — FLOWSHEET NOTE
[] [facility]  Outpatient Rehabilitation &  Therapy  [address]  [phone]  [phone] [] [facility]  Outpatient Rehabilitation &  Therapy  [address]  P: [phone]  F: [phone] [x] [facility]  Outpatient Rehabilitation &  Therapy  [address]  P: [phone]  F: [phone] [] [facility]  Outpatient Rehabilitation &  Therapy  [address]  [phone]  [phone] [] [facility]  Outpatient Rehabilitation &  Therapy  [address]   P: [phone]  F: [phone]  [] [facility]  Outpatient Rehabilitation &  Therapy  [address]  P: [phone]  F: [phone] [] [facility]  Outpatient Rehabilitation &  Therapy  [address]  P: [phone]  F: [phone] [] [facility]  Outpatient Rehabilitation &  Therapy  [address]  P: [phone]  F: [phone] [] [facility]  Outpatient Rehabilitation &  Therapy  [address]  P: [phone]  F: [phone]  [] [facility] Outpatient Rehabilitation &  Therapy  [address]  P: [phone]  F: [phone]     Physical Therapy Daily Treatment Note    Date:  2024  Patient Name:  Azalea Meyers    :  2000  MRN: [id number]  Physician: WOLF Lyles                   Insurance:  [place] PPO: tracee  -  60 vs remain, hard max, combined; No auth req'd; OOP met - covered 100%    Medical Diagnosis:   Z01.419 (ICD-10-CM) - Well woman exam   R10.2 (ICD-10-CM) - Pelvic pain      Rehab Codes: R10.2, R27.8, M62.81, M62.08, R39.15, N39.41   Onset Date:  3/20/2022                       Next 's appt: 24  Visit# / total visits: ;     Cancels/No Shows: 0/0    Subjective:    Pain:  [x] Yes  [] No Location: (L) lumbar

## 2024-09-03 ENCOUNTER — HOSPITAL ENCOUNTER (OUTPATIENT)
Dept: PHYSICAL THERAPY | Facility: CLINIC | Age: 24
Setting detail: THERAPIES SERIES
Discharge: HOME | End: 2024-09-03
Payer: COMMERCIAL

## 2024-09-03 PROCEDURE — 97110 THERAPEUTIC EXERCISES: CPT

## 2024-09-03 NOTE — FLOWSHEET NOTE
[] [facility]  Outpatient Rehabilitation &  Therapy  [address]  [phone]  [phone] [] [facility]  Outpatient Rehabilitation &  Therapy  [address]  P: [phone]  F: [phone] [x] [facility]  Outpatient Rehabilitation &  Therapy  [address]  P: [phone]  F: [phone] [] [facility]  Outpatient Rehabilitation &  Therapy  [address]  [phone]  [phone] [] [facility]  Outpatient Rehabilitation &  Therapy  [address]   P: [phone]  F: [phone]  [] [facility]  Outpatient Rehabilitation &  Therapy  [address]  P: [phone]  F: [phone] [] [facility]  Outpatient Rehabilitation &  Therapy  [address]  P: [phone]  F: [phone] [] [facility]  Outpatient Rehabilitation &  Therapy  [address]  P: [phone]  F: [phone] [] [facility]  Outpatient Rehabilitation &  Therapy  [address]  P: [phone]  F: [phone]  [] [facility] Outpatient Rehabilitation &  Therapy  [address]  P: [phone]  F: [phone]     Physical Therapy Daily Treatment Note    Date:  9/3/2024  Patient Name:  Azalea Meyers    :  2000  MRN: [id number]  Physician: WOLF Lyles                   Insurance:  [place] PPO: tracee  -  60 vs remain, hard max, combined; No auth req'd; OOP met - covered 100%    Medical Diagnosis:   Z01.419 (ICD-10-CM) - Well woman exam   R10.2 (ICD-10-CM) - Pelvic pain      Rehab Codes: R10.2, R27.8, M62.81, M62.08, R39.15, N39.41   Onset Date:  3/20/2022                       Next 's appt: 24  Visit# / total visits: ;     Cancels/No Shows: 0/0    Subjective:    Pain:  [x] Yes  [] No Location: (L) lumbar

## 2024-09-06 ENCOUNTER — APPOINTMENT (OUTPATIENT)
Dept: PHYSICAL THERAPY | Facility: CLINIC | Age: 24
End: 2024-09-06
Payer: COMMERCIAL

## 2024-09-11 ENCOUNTER — HOSPITAL ENCOUNTER (OUTPATIENT)
Dept: PHYSICAL THERAPY | Facility: CLINIC | Age: 24
Setting detail: THERAPIES SERIES
Discharge: HOME | End: 2024-09-11
Payer: COMMERCIAL

## 2024-09-11 PROCEDURE — 97140 MANUAL THERAPY 1/> REGIONS: CPT

## 2024-09-11 PROCEDURE — 97110 THERAPEUTIC EXERCISES: CPT

## 2024-09-19 ENCOUNTER — HOSPITAL ENCOUNTER (OUTPATIENT)
Dept: PHYSICAL THERAPY | Facility: CLINIC | Age: 24
Setting detail: THERAPIES SERIES
Discharge: HOME | End: 2024-09-19
Payer: COMMERCIAL

## 2024-09-19 PROCEDURE — 97110 THERAPEUTIC EXERCISES: CPT

## 2024-09-24 ENCOUNTER — OFFICE VISIT (OUTPATIENT)
Dept: GASTROENTEROLOGY | Age: 24
End: 2024-09-24
Payer: COMMERCIAL

## 2024-09-24 VITALS
DIASTOLIC BLOOD PRESSURE: 72 MMHG | TEMPERATURE: 97.6 F | SYSTOLIC BLOOD PRESSURE: 107 MMHG | HEART RATE: 80 BPM | BODY MASS INDEX: 19.53 KG/M2 | OXYGEN SATURATION: 100 % | HEIGHT: 64 IN | WEIGHT: 114.4 LBS | RESPIRATION RATE: 16 BRPM

## 2024-09-24 DIAGNOSIS — R19.4 CHANGE IN BOWEL HABIT: Primary | ICD-10-CM

## 2024-09-24 DIAGNOSIS — K62.5 BRBPR (BRIGHT RED BLOOD PER RECTUM): ICD-10-CM

## 2024-09-24 DIAGNOSIS — R63.4 WEIGHT LOSS: ICD-10-CM

## 2024-09-24 DIAGNOSIS — R19.8 CHANGE IN BOWEL FUNCTION: ICD-10-CM

## 2024-09-24 DIAGNOSIS — K59.04 CHRONIC IDIOPATHIC CONSTIPATION: ICD-10-CM

## 2024-09-24 DIAGNOSIS — Z72.89 CURRENT EVERY DAY VAPING: ICD-10-CM

## 2024-09-24 DIAGNOSIS — K21.9 GASTROESOPHAGEAL REFLUX DISEASE WITHOUT ESOPHAGITIS: ICD-10-CM

## 2024-09-24 DIAGNOSIS — R13.19 ESOPHAGEAL DYSPHAGIA: ICD-10-CM

## 2024-09-24 DIAGNOSIS — R19.7 DIARRHEA, UNSPECIFIED TYPE: ICD-10-CM

## 2024-09-24 PROCEDURE — 99204 OFFICE O/P NEW MOD 45 MIN: CPT | Performed by: STUDENT IN AN ORGANIZED HEALTH CARE EDUCATION/TRAINING PROGRAM

## 2024-09-25 ENCOUNTER — APPOINTMENT (OUTPATIENT)
Dept: PHYSICAL THERAPY | Facility: CLINIC | Age: 24
End: 2024-09-25
Payer: COMMERCIAL

## 2024-09-25 RX ORDER — SODIUM, POTASSIUM,MAG SULFATES 17.5-3.13G
SOLUTION, RECONSTITUTED, ORAL ORAL
Qty: 177 ML | Refills: 0 | Status: SHIPPED | OUTPATIENT
Start: 2024-09-25

## 2024-09-26 ENCOUNTER — HOSPITAL ENCOUNTER (OUTPATIENT)
Dept: PHYSICAL THERAPY | Facility: CLINIC | Age: 24
Setting detail: THERAPIES SERIES
Discharge: HOME | End: 2024-09-26
Payer: COMMERCIAL

## 2024-09-26 PROCEDURE — 97110 THERAPEUTIC EXERCISES: CPT

## 2024-09-26 PROCEDURE — 97140 MANUAL THERAPY 1/> REGIONS: CPT

## 2024-09-30 ENCOUNTER — HOSPITAL ENCOUNTER (OUTPATIENT)
Age: 24
Setting detail: SPECIMEN
Discharge: HOME | End: 2024-09-30

## 2024-09-30 DIAGNOSIS — R19.4 CHANGE IN BOWEL HABIT: ICD-10-CM

## 2024-09-30 LAB
CRP SERPL HS-MCNC: <3 MG/L (ref 0–5)
ERYTHROCYTE [SEDIMENTATION RATE] IN BLOOD BY PHOTOMETRIC METHOD: 1 MM/HR (ref 0–20)
HAV IGM SERPL QL IA: NONREACTIVE
HBV CORE IGM SERPL QL IA: NONREACTIVE
HBV SURFACE AG SERPL QL IA: NONREACTIVE
HCV AB SERPL QL IA: NONREACTIVE

## 2024-10-02 ENCOUNTER — HOSPITAL ENCOUNTER (OUTPATIENT)
Dept: PHYSICAL THERAPY | Facility: CLINIC | Age: 24
Setting detail: THERAPIES SERIES
Discharge: HOME | End: 2024-10-02
Payer: COMMERCIAL

## 2024-10-02 LAB
QUANTI TB GOLD PLUS: NEGATIVE
QUANTI TB1 MINUS NIL: 0 IU/ML
QUANTI TB2 MINUS NIL: 0.01 IU/ML
QUANTIFERON MITOGEN: 4.69 IU/ML
QUANTIFERON NIL: 0 IU/ML

## 2024-10-02 PROCEDURE — 97140 MANUAL THERAPY 1/> REGIONS: CPT

## 2024-10-02 PROCEDURE — 97110 THERAPEUTIC EXERCISES: CPT

## 2024-10-02 NOTE — FLOWSHEET NOTE
paraspinals   Pain Rating: (0-10 scale) 0/10  Pain altered Tx:  [x] No  [] Yes  Action:  Comments: Patient reports to physical therapy this date stating that she has had increased pain and tension within her (R) lower abdominals and hip. States that pain began after completing praying mantis and pot stirs at home. States that she has difficulty with twisting her trunk and trunk extension. Has been using heat to reduce pain with little relief. Otherwise no other issues at this time.     Objective:  Modalities:   Precautions:  Exercises:  Exercise Reps/ Time Weight/ Level Comments    Diaphragmatic breathing   x15        Butterfly S 30\"x3      Happy baby S 30\"x3        Piriformis S 30\"x3        Seated Figure 4  30\"x3   x   EOB hip flexor S 30\"x3     x   LTR  5\"x15     x   Cobra S 30\"x3   x   Thread the needle x20      Quadruped thoracic rot x20      Supine Lying on Foam Roller 3'      Foam roller scissors  x20      Foam roller horizontal abduction  x20      Bar lat S 30\"x3             Bridges 2x15 blue      Clamshells 2x15 blue      SL hip abduction 2x15 blue      Hip adduction ball squeeze 2x10      SLR 2x15                 TA set   3\"x15         TA set + bent knee fallout   x20        TA set + marches  x20      TA set + walk out  x10      TA set +  toe tap x10      TA set + SLR x10ea             Quadruped TA set  3\"x15      Quadruped Alt UE x20      Bird Dog x20             SB marches x20      SB LAQ x20      SB alt UE/LE x20      Praying Mantis  x10      Pot Stirs x10ea             3-way hip x20 lime     Palloff press x20 green     Palloff step out x10 green     Diagonal chops x20 15#            TB rows x20 blue  x   TB shoulder extension x20 orange  x   TB shoulder ER x20 blue Inc resist 8/7 x   TB shoulder IR x20 blue Inc resist 8/7    Wall clocks x10ea orange     TB ER with scap retraction 2x10 blue     Other: DI (R) psoas, DI (R) iliacus, STM to (R) rectus abdominus       Treatment Charges: Mins Units   [x]

## 2024-10-11 ENCOUNTER — HOSPITAL ENCOUNTER (OUTPATIENT)
Dept: PHYSICAL THERAPY | Facility: CLINIC | Age: 24
Setting detail: THERAPIES SERIES
Discharge: HOME | End: 2024-10-11
Payer: COMMERCIAL

## 2024-10-11 PROCEDURE — 97110 THERAPEUTIC EXERCISES: CPT

## 2024-10-14 ENCOUNTER — HOSPITAL ENCOUNTER (OUTPATIENT)
Dept: PHYSICAL THERAPY | Facility: CLINIC | Age: 24
Setting detail: THERAPIES SERIES
Discharge: HOME | End: 2024-10-14
Payer: COMMERCIAL

## 2024-10-14 PROCEDURE — 97110 THERAPEUTIC EXERCISES: CPT

## 2024-10-14 PROCEDURE — 97140 MANUAL THERAPY 1/> REGIONS: CPT

## 2024-10-14 NOTE — PROGRESS NOTES
[] [facility]  Outpatient Rehabilitation &  Therapy  [address]  [phone]  [phone] [] [facility]  Outpatient Rehabilitation &  Therapy  [address]  P: [phone]  F: [phone] [x] [facility]  Outpatient Rehabilitation &  Therapy  [address]  P: [phone]  F: [phone] [] [facility]  Outpatient Rehabilitation &  Therapy  [address]  [phone]  [phone] [] [facility]  Outpatient Rehabilitation &  Therapy  [address]   P: [phone]  F: [phone]  [] [facility]  Outpatient Rehabilitation &  Therapy  [address]  P: [phone]  F: [phone] [] [facility]  Outpatient Rehabilitation &  Therapy  [address]  P: [phone]  F: [phone] [] [facility]  Outpatient Rehabilitation &  Therapy  [address]  P: [phone]  F: [phone] [] [facility]  Outpatient Rehabilitation &  Therapy  [address]  P: [phone]  F: [phone]  [] [facility] Outpatient Rehabilitation &  Therapy  [address]  P: [phone]  F: [phone]     Physical Therapy Daily Treatment Note    Date:  10/14/2024  Patient Name:  Azalea Meyers    :  2000  MRN: [id number]  Physician: WOLF Lyles                   Insurance:  [place] PPO: tracee  -  60 vs remain, hard max, combined; No auth req'd; OOP met - covered 100%    Medical Diagnosis:   Z01.419 (ICD-10-CM) - Well woman exam   R10.2 (ICD-10-CM) - Pelvic pain      Rehab Codes: R10.2, R27.8, M62.81, M62.08, R39.15, N39.41   Onset Date:  3/20/2022                       Next 's appt: 24  Visit# / total visits: ;     Cancels/No Shows: 0/0    Subjective:    Pain:  [x] Yes  [] No Location: (L) lumbar

## 2024-10-18 ENCOUNTER — HOSPITAL ENCOUNTER (OUTPATIENT)
Age: 24
Setting detail: SPECIMEN
Discharge: HOME | End: 2024-10-18
Payer: COMMERCIAL

## 2024-10-18 ENCOUNTER — OFFICE VISIT (OUTPATIENT)
Dept: FAMILY MEDICINE CLINIC | Age: 24
End: 2024-10-18
Payer: COMMERCIAL

## 2024-10-18 VITALS
BODY MASS INDEX: 20.05 KG/M2 | HEART RATE: 79 BPM | WEIGHT: 116.8 LBS | DIASTOLIC BLOOD PRESSURE: 60 MMHG | SYSTOLIC BLOOD PRESSURE: 108 MMHG | OXYGEN SATURATION: 98 % | TEMPERATURE: 98.4 F | RESPIRATION RATE: 18 BRPM

## 2024-10-18 DIAGNOSIS — S16.1XXA STRAIN OF NECK MUSCLE, INITIAL ENCOUNTER: Primary | ICD-10-CM

## 2024-10-18 DIAGNOSIS — R19.4 CHANGE IN BOWEL HABIT: ICD-10-CM

## 2024-10-18 PROCEDURE — 96372 THER/PROPH/DIAG INJ SC/IM: CPT | Performed by: NURSE PRACTITIONER

## 2024-10-18 PROCEDURE — 83993 ASSAY FOR CALPROTECTIN FECAL: CPT

## 2024-10-18 PROCEDURE — 99213 OFFICE O/P EST LOW 20 MIN: CPT | Performed by: NURSE PRACTITIONER

## 2024-10-18 RX ORDER — KETOROLAC TROMETHAMINE 30 MG/ML
30 INJECTION, SOLUTION INTRAMUSCULAR; INTRAVENOUS ONCE
Status: COMPLETED | OUTPATIENT
Start: 2024-10-18 | End: 2024-10-18

## 2024-10-18 RX ORDER — CYCLOBENZAPRINE HCL 5 MG
5 TABLET ORAL 3 TIMES DAILY PRN
Qty: 30 TABLET | Refills: 0 | Status: SHIPPED | OUTPATIENT
Start: 2024-10-18 | End: 2024-10-28

## 2024-10-18 RX ORDER — METHYLPREDNISOLONE 4 MG
TABLET, DOSE PACK ORAL
Qty: 1 KIT | Refills: 0 | Status: SHIPPED | OUTPATIENT
Start: 2024-10-18 | End: 2024-10-24

## 2024-10-18 RX ADMIN — KETOROLAC TROMETHAMINE 30 MG: 30 INJECTION, SOLUTION INTRAMUSCULAR; INTRAVENOUS at 16:23

## 2024-10-18 ASSESSMENT — ENCOUNTER SYMPTOMS
VOMITING: 0
COLOR CHANGE: 0
NAUSEA: 0

## 2024-10-18 NOTE — PROGRESS NOTES
[facility] PHYSICIANS [facility], [facility] WALK-IN  [address]  [address]  [place] [address]  Dept: [phone]  Dept Fax: [phone]    Azalea Meyers is a 24 y.o. female who presents today for her medical conditions/complaints of   Chief Complaint   Patient presents with    Pain     Possible pulled muscle in neck/ shoulder on L side. X Today          HPI:     /60   Pulse 79   Temp 98.4 °F (36.9 °C)   Resp 18   Wt 53 kg (116 lb 12.8 oz)   SpO2 98%   BMI 20.05 kg/m²       HPI  Pt presented to the clinic today with c/o neck pain. This is a new problem. The current episode started today. Pt states she reached out to  her toddler and developed a severe pain to the left side of her neck.Pain is constant and sharp shooting pain with any movement. There is tingling into her left arm and pain with ROM.  No erythema,swelling, numbness  .  Pt has tried heat with little improvement.       Past Medical History:   Diagnosis Date    ADHD (attention deficit hyperactivity disorder)     Anxiety     Depression         Past Surgical History:   Procedure Laterality Date    APPENDECTOMY  02/22/2024    Either the 22 or the 21    CHOLECYSTECTOMY      1/2021       Family History   Problem Relation Age of Onset    Heart Surgery Maternal Grandfather     Diabetes Paternal Grandmother        Social History     Tobacco Use    Smoking status: Every Day     Types: E-Cigarettes     Passive exposure: Current    Smokeless tobacco: Never   Substance Use Topics    Alcohol use: Never        Prior to Visit Medications    Medication Sig Taking? Authorizing Provider   sertraline (ZOLOFT) 50 MG tablet Take 1 tablet by mouth daily Yes Provider, MD Nancy   cyclobenzaprine (FLEXERIL) 5 MG tablet Take 1 tablet by mouth 3 times daily as needed for Muscle spasms Yes Rubi Lin APRN - CNP   methylPREDNISolone (MEDROL DOSEPACK) 4 MG tablet Take by mouth. Yes Rubi Lin APRN -

## 2024-10-23 LAB — CALPROTECTIN, FECAL: <5 UG/G

## 2024-10-24 ENCOUNTER — HOSPITAL ENCOUNTER (OUTPATIENT)
Dept: PREADMISSION TESTING | Age: 24
Discharge: HOME | End: 2024-10-28

## 2024-10-24 VITALS — BODY MASS INDEX: 20.38 KG/M2 | WEIGHT: 115 LBS | HEIGHT: 63 IN

## 2024-10-24 NOTE — PROGRESS NOTES
Pre-op Instructions For Out-Patient Endoscopy Surgery    Medication Instructions:  Please stop herbs and any supplements now (includes vitamins and minerals).    Please contact your surgeon and prescribing physician for pre-op instructions for any blood thinners.    If you have inhalers/aerosol treatments at home, please use them the morning of your surgery and bring the inhalers with you to the hospital.    Please take the following medications the morning of your surgery with a sip of water:    No medications    Surgery Instructions:  After midnight before surgery:  Do not eat or drink anything, including water, mints, gum, and hard candy.  You may brush your teeth without swallowing.  No smoking, chewing tobacco, or street drugs.    Please shower or bathe before surgery.       Please do not wear any cologne, lotion, powder, jewelry, piercings, perfume, makeup, nail polish, hair accessories, or hair spray on the day of surgery.  Wear loose comfortable clothing.    Leave your valuables at home but bring a payment source for any after-surgery prescriptions you plan to fill at [facility].  Bring a storage case for any glasses/contacts.    An adult who is responsible for you MUST drive you home and should be with you for the first 24 hours after surgery.     The Day of Surgery:  Arrive at [facility] Entr[facility] at the time directed by your surgeon and check in at the desk.     If you have a living will or healthcare power of , please bring a copy.    You will be taken to the pre-op holding area where you will be prepared for surgery.  A physical assessment will be performed by a nurse practitioner or house officer.  Your IV will be started and you will meet your anesthesiologist.    When you go to surgery, your family will be directed to the surgical waiting room, where the doctor should speak with them after your surgery.    After surgery, you will be taken to the recovery area.

## 2024-10-28 NOTE — PRE-PROCEDURE INSTRUCTIONS
Have you received your Prep? YES Any questions with prep instructions?NO  Only Clear Liquid Diet day before.PATIENT TO FOLLOW DR ROSEN INSTRUCTIONS  Nothing to eat after midnight day before procedure.PATIENT TO FOLLOW DR ROSEN INSTRUCTIONS  Are you taking any blood thinners?NO If so, you need to Stop.  Remove any jewelry and body piercings.INSTRUCTED  Are you having any Covid symptoms?NO  Do you have any new rashes, infections, etc. that we should be aware of?NO  Do you have a ride home the day of surgery?YES  It cannot be a cab or medical transportation.  Verify surgery time/date and what time to arrive at hospital. [id number]

## 2024-10-29 ENCOUNTER — ANESTHESIA EVENT (OUTPATIENT)
Dept: ENDOSCOPY | Age: 24
End: 2024-10-29
Payer: COMMERCIAL

## 2024-10-30 ENCOUNTER — ANESTHESIA (OUTPATIENT)
Dept: ENDOSCOPY | Age: 24
End: 2024-10-30
Payer: COMMERCIAL

## 2024-10-30 ENCOUNTER — HOSPITAL ENCOUNTER (OUTPATIENT)
Age: 24
Setting detail: OUTPATIENT SURGERY
Discharge: HOME | End: 2024-10-30
Attending: STUDENT IN AN ORGANIZED HEALTH CARE EDUCATION/TRAINING PROGRAM | Admitting: STUDENT IN AN ORGANIZED HEALTH CARE EDUCATION/TRAINING PROGRAM
Payer: COMMERCIAL

## 2024-10-30 VITALS
SYSTOLIC BLOOD PRESSURE: 97 MMHG | HEART RATE: 75 BPM | HEIGHT: 63 IN | DIASTOLIC BLOOD PRESSURE: 66 MMHG | WEIGHT: 115 LBS | BODY MASS INDEX: 20.38 KG/M2 | OXYGEN SATURATION: 99 % | RESPIRATION RATE: 18 BRPM | TEMPERATURE: 97 F

## 2024-10-30 DIAGNOSIS — K21.9 GASTROESOPHAGEAL REFLUX DISEASE, UNSPECIFIED WHETHER ESOPHAGITIS PRESENT: ICD-10-CM

## 2024-10-30 DIAGNOSIS — R19.4 CHANGE IN BOWEL HABITS: ICD-10-CM

## 2024-10-30 PROBLEM — K20.90 ESOPHAGITIS: Status: ACTIVE | Noted: 2024-10-30

## 2024-10-30 PROBLEM — R13.10 DYSPHAGIA: Status: ACTIVE | Noted: 2024-10-30

## 2024-10-30 PROBLEM — K64.8 INTERNAL HEMORRHOID: Status: ACTIVE | Noted: 2024-10-30

## 2024-10-30 PROBLEM — K52.9 CHRONIC DIARRHEA: Status: ACTIVE | Noted: 2024-10-30

## 2024-10-30 PROBLEM — K29.70 GASTRITIS WITHOUT BLEEDING: Status: ACTIVE | Noted: 2024-10-30

## 2024-10-30 PROCEDURE — 7100000011 HC PHASE II RECOVERY - ADDTL 15 MIN: Performed by: STUDENT IN AN ORGANIZED HEALTH CARE EDUCATION/TRAINING PROGRAM

## 2024-10-30 PROCEDURE — 6360000002 HC RX W HCPCS: Performed by: NURSE ANESTHETIST, CERTIFIED REGISTERED

## 2024-10-30 PROCEDURE — 88305 TISSUE EXAM BY PATHOLOGIST: CPT

## 2024-10-30 PROCEDURE — 3609012400 HC EGD TRANSORAL BIOPSY SINGLE/MULTIPLE: Performed by: STUDENT IN AN ORGANIZED HEALTH CARE EDUCATION/TRAINING PROGRAM

## 2024-10-30 PROCEDURE — 81025 URINE PREGNANCY TEST: CPT

## 2024-10-30 PROCEDURE — 3700000000 HC ANESTHESIA ATTENDED CARE: Performed by: STUDENT IN AN ORGANIZED HEALTH CARE EDUCATION/TRAINING PROGRAM

## 2024-10-30 PROCEDURE — 3609010300 HC COLONOSCOPY W/BIOPSY SINGLE/MULTIPLE: Performed by: STUDENT IN AN ORGANIZED HEALTH CARE EDUCATION/TRAINING PROGRAM

## 2024-10-30 PROCEDURE — 2709999900 HC NON-CHARGEABLE SUPPLY: Performed by: STUDENT IN AN ORGANIZED HEALTH CARE EDUCATION/TRAINING PROGRAM

## 2024-10-30 PROCEDURE — 2580000003 HC RX 258: Performed by: ANESTHESIOLOGY

## 2024-10-30 PROCEDURE — 2500000003 HC RX 250 WO HCPCS: Performed by: NURSE ANESTHETIST, CERTIFIED REGISTERED

## 2024-10-30 PROCEDURE — 7100000010 HC PHASE II RECOVERY - FIRST 15 MIN: Performed by: STUDENT IN AN ORGANIZED HEALTH CARE EDUCATION/TRAINING PROGRAM

## 2024-10-30 PROCEDURE — 3700000001 HC ADD 15 MINUTES (ANESTHESIA): Performed by: STUDENT IN AN ORGANIZED HEALTH CARE EDUCATION/TRAINING PROGRAM

## 2024-10-30 RX ORDER — PROPOFOL 10 MG/ML
INJECTION, EMULSION INTRAVENOUS
Status: DISCONTINUED | OUTPATIENT
Start: 2024-10-30 | End: 2024-10-30 | Stop reason: SDUPTHER

## 2024-10-30 RX ORDER — SODIUM CHLORIDE 9 MG/ML
INJECTION, SOLUTION INTRAVENOUS PRN
Status: DISCONTINUED | OUTPATIENT
Start: 2024-10-30 | End: 2024-10-30 | Stop reason: HOSPADM

## 2024-10-30 RX ORDER — LIDOCAINE HYDROCHLORIDE 20 MG/ML
INJECTION, SOLUTION EPIDURAL; INFILTRATION; INTRACAUDAL; PERINEURAL
Status: DISCONTINUED | OUTPATIENT
Start: 2024-10-30 | End: 2024-10-30 | Stop reason: SDUPTHER

## 2024-10-30 RX ORDER — LIDOCAINE HYDROCHLORIDE 10 MG/ML
1 INJECTION, SOLUTION EPIDURAL; INFILTRATION; INTRACAUDAL; PERINEURAL
Status: DISCONTINUED | OUTPATIENT
Start: 2024-10-30 | End: 2024-10-30 | Stop reason: HOSPADM

## 2024-10-30 RX ORDER — SODIUM CHLORIDE 0.9 % (FLUSH) 0.9 %
5-40 SYRINGE (ML) INJECTION EVERY 12 HOURS SCHEDULED
Status: DISCONTINUED | OUTPATIENT
Start: 2024-10-30 | End: 2024-10-30 | Stop reason: HOSPADM

## 2024-10-30 RX ORDER — MIDAZOLAM HYDROCHLORIDE 1 MG/ML
INJECTION, SOLUTION INTRAMUSCULAR; INTRAVENOUS
Status: DISCONTINUED | OUTPATIENT
Start: 2024-10-30 | End: 2024-10-30 | Stop reason: SDUPTHER

## 2024-10-30 RX ORDER — SODIUM CHLORIDE, SODIUM LACTATE, POTASSIUM CHLORIDE, CALCIUM CHLORIDE 600; 310; 30; 20 MG/100ML; MG/100ML; MG/100ML; MG/100ML
INJECTION, SOLUTION INTRAVENOUS CONTINUOUS
Status: DISCONTINUED | OUTPATIENT
Start: 2024-10-30 | End: 2024-10-30 | Stop reason: HOSPADM

## 2024-10-30 RX ORDER — SODIUM CHLORIDE 0.9 % (FLUSH) 0.9 %
5-40 SYRINGE (ML) INJECTION PRN
Status: DISCONTINUED | OUTPATIENT
Start: 2024-10-30 | End: 2024-10-30 | Stop reason: HOSPADM

## 2024-10-30 RX ORDER — FENTANYL CITRATE 50 UG/ML
INJECTION, SOLUTION INTRAMUSCULAR; INTRAVENOUS
Status: DISCONTINUED | OUTPATIENT
Start: 2024-10-30 | End: 2024-10-30 | Stop reason: SDUPTHER

## 2024-10-30 RX ADMIN — PROPOFOL 70 MG: 10 INJECTION, EMULSION INTRAVENOUS at 12:36

## 2024-10-30 RX ADMIN — MIDAZOLAM 1 MG: 1 INJECTION INTRAMUSCULAR; INTRAVENOUS at 12:36

## 2024-10-30 RX ADMIN — FENTANYL CITRATE 25 MCG: 50 INJECTION INTRAMUSCULAR; INTRAVENOUS at 12:36

## 2024-10-30 RX ADMIN — SODIUM CHLORIDE, PRESERVATIVE FREE 20 ML: 5 INJECTION INTRAVENOUS at 11:25

## 2024-10-30 RX ADMIN — LIDOCAINE HYDROCHLORIDE 80 MG: 20 INJECTION, SOLUTION EPIDURAL; INFILTRATION; INTRACAUDAL; PERINEURAL at 12:36

## 2024-10-30 RX ADMIN — SODIUM CHLORIDE, POTASSIUM CHLORIDE, SODIUM LACTATE AND CALCIUM CHLORIDE: 600; 310; 30; 20 INJECTION, SOLUTION INTRAVENOUS at 12:31

## 2024-10-30 RX ADMIN — PROPOFOL 200 MCG/KG/MIN: 10 INJECTION, EMULSION INTRAVENOUS at 12:36

## 2024-10-30 ASSESSMENT — PAIN SCALES - GENERAL
PAINLEVEL_OUTOF10: 0
PAINLEVEL_OUTOF10: 0

## 2024-10-30 ASSESSMENT — ENCOUNTER SYMPTOMS
DIARRHEA: 1
RESPIRATORY NEGATIVE: 1
ABDOMINAL PAIN: 1
VOMITING: 1
CONSTIPATION: 1
NAUSEA: 1

## 2024-10-30 ASSESSMENT — PAIN - FUNCTIONAL ASSESSMENT: PAIN_FUNCTIONAL_ASSESSMENT: 0-10

## 2024-10-30 NOTE — ANESTHESIA PRE PROCEDURE
22   • CHOLECYSTECTOMY      1/2021   • COLONOSCOPY  10/2020   • ESOPHAGOGASTRODUODENOSCOPY  10/2020       Social History:    Social History     Tobacco Use   • Smoking status: Every Day     Types: E-Cigarettes     Passive exposure: Current   • Smokeless tobacco: Never   Substance Use Topics   • Alcohol use: Never                                Ready to quit: Not Answered  Counseling given: Not Answered      Vital Signs (Current):   Vitals:    10/30/24 1055   BP: (!) 94/57   Pulse: 78   Resp: 16   Temp: 98.4 °F (36.9 °C)   TempSrc: Infrared   SpO2: 98%   Weight: 52.2 kg (115 lb)   Height: 1.6 m (5' 3\")                                              BP Readings from Last 3 Encounters:   10/30/24 (!) 94/57   10/18/24 108/60   09/24/24 107/72       NPO Status: Time of last liquid consumption: 0430                        Time of last solid consumption: 1800                        Date of last liquid consumption: 10/30/24                        Date of last solid food consumption: 10/27/24    BMI:   Wt Readings from Last 3 Encounters:   10/30/24 52.2 kg (115 lb)   10/24/24 52.2 kg (115 lb)   10/18/24 53 kg (116 lb 12.8 oz)     Body mass index is 20.37 kg/m².    CBC:   Lab Results   Component Value Date/Time    WBC 6.8 07/31/2024 08:02 AM    RBC 4.28 07/31/2024 08:02 AM    HGB 13.6 07/31/2024 08:02 AM    HCT 40.5 07/31/2024 08:02 AM    MCV 94.6 07/31/2024 08:02 AM    RDW 12.3 07/31/2024 08:02 AM     07/31/2024 08:02 AM       CMP:   Lab Results   Component Value Date/Time     07/31/2024 08:02 AM    K 4.3 07/31/2024 08:02 AM     07/31/2024 08:02 AM    CO2 22 07/31/2024 08:02 AM    BUN 14 07/31/2024 08:02 AM    CREATININE 0.8 07/31/2024 08:02 AM    LABGLOM >90 07/31/2024 08:02 AM    GLUCOSE 67 07/31/2024 08:02 AM    CALCIUM 9.1 07/31/2024 08:02 AM    BILITOT 0.3 07/31/2024 08:02 AM    ALKPHOS 61 07/31/2024 08:02 AM    AST 20 07/31/2024 08:02 AM    ALT 15 07/31/2024 08:02 AM       POC Tests: No results for

## 2024-10-30 NOTE — DISCHARGE INSTRUCTIONS
EGD DISCHARGE INSTRUCTIONS    Activity:  Rest today. No driving, operating machinery, or making any important decisions today. May resume normal activity tomorrow.    Diet:  Following EGD eat slowly, chew food well, cut food into small pieces-Avoid greasy, spicy, \"crunchy\" foods X 48 hours.     Call your Doctor if you have any of the following:  -Passing blood rectally or vomiting blood (it may be red or black).  -Persistent nausea/vomiting  -Severe abdominal or chest pain not relieved with passing gas  -Fever of 100 degrees or more  -Redness or swelling at the IV site  -Severe sore throat or neck pain           Esophagitis: Care Instructions  Overview     Esophagitis (say \"ih-sof-uh-JY-tus\") is irritation of the esophagus, the tube that carries food from your throat to your stomach.  Acid reflux is the most common cause of this condition. When you have reflux, stomach acid and juices flow upward. This can cause pain or a burning feeling in your chest. You may have a sore throat. It may be hard to swallow.  Other causes of this condition include some medicines and supplements. Allergies or an infection can also cause it.  Your doctor will ask about your symptoms and past health. The doctor might do tests to find the cause of your symptoms.  Treatment depends on what is causing the problem. Treatment might include changing your diet or taking medicine to relieve your symptoms. It might also include changing a medicine that is causing your symptoms.  If you have reflux, medicine that reduces the stomach acid helps your body heal. It might take 1 to 3 weeks to heal.  Follow-up care is a key part of your treatment and safety. Be sure to make and go to all appointments, and call your doctor if you are having problems. It's also a good idea to know your test results and keep a list of the medicines you take.  How can you care for yourself at home?  If you have acid reflux, your doctor may recommend that you:  Eat several

## 2024-10-30 NOTE — ANESTHESIA POSTPROCEDURE EVALUATION
Department of Anesthesiology  Postprocedure Note    Patient: Azalea Meyers  MRN: [id number]  YOB: 2000  Date of evaluation: 10/30/2024    Procedure Summary       Date: 10/30/24 Room / Location: [place] [address] / [facility]    Anesthesia Start: 1231 Anesthesia Stop: 1319    Procedures:       ESOPHAGOGASTRODUODENOSCOPY BIOPSY (Esophagus)      COLONOSCOPY BIOPSY Diagnosis:       Gastroesophageal reflux disease, unspecified whether esophagitis present      Change in bowel habits      (Gastroesophageal reflux disease, unspecified whether esophagitis present [K21.9])      (Change in bowel habits [R19.4])    Surgeons: Atif Sharp MD Responsible Provider: Joseph Hawkins MD    Anesthesia Type: general, TIVA ASA Status: 2            Anesthesia Type: No value filed.    Shae Phase I: Shae Score: 10    Shae Phase II: Shae Score: 10    Anesthesia Post Evaluation    Comments: POST- ANESTHESIA EVALUATION       Pt Name: Azalea Meyers  MRN: [id number]  YOB: 2000  Date of evaluation: 10/30/2024  Time:  4:37 PM      BP 97/66   Pulse 75   Temp 97 °F (36.1 °C)   Resp 18   Ht 1.6 m (5' 3\")   Wt 52.2 kg (115 lb)   SpO2 99%   BMI 20.37 kg/m²      Consciousness Level  Awake  Cardiopulmonary Status  Stable  Pain Adequately Treated YES  Nausea / Vomiting  NO  Adequate Hydration  YES  Anesthesia Related Complications NONE      Electronically signed by Joseph Hawkins MD on 10/30/2024 at 4:37 PM           No notable events documented.

## 2024-10-30 NOTE — OP NOTE
COLONOSCOPY    DATE OF PROCEDURE: 10/30/2024    SURGEON: Atif Sharp MD  Facility : [facility]  ASSISTANT: None  Anesthesia: Monitored anesthesia care  PREOPERATIVE DIAGNOSIS: Constipation, chronic diarrhea, change in bowel habit, bright red blood per rectum    POSTOPERATIVE DIAGNOSIS: as described below    OPERATION: Total colonoscopy with biopsy    ANESTHESIA: Monitored Anesthesia Care (MAC)    ESTIMATED BLOOD LOSS: less than 50 cc    COMPLICATIONS: None.     SPECIMENS:  Was Obtained:     ID Type Source Tests Collected by Time Destination   A : DUODENUM BIOPSY Tissue Duodenum SURGICAL PATHOLOGY Atif Sharp MD 10/30/2024 1241    B : STOMACH BIOPSY Tissue Stomach SURGICAL PATHOLOGY Atif Sharp MD 10/30/2024 1241    C : GE JUNCTION BIOPSY Tissue GE Junction Biopsy SURGICAL PATHOLOGY Atif Sharp MD 10/30/2024 1243    D : ESOPHAGUS BIOPSY TO RULE OUT EOE Tissue Esophagus SURGICAL PATHOLOGY Atif Sharp MD 10/30/2024 1241    E : RANDOM COLON BIOPSY Tissue Colon SURGICAL PATHOLOGY Atif Sharp MD 10/30/2024 1254    F : terminal ileum Tissue Ileum SURGICAL PATHOLOGY Atif Sharp MD 10/30/2024 1259         HISTORY: The patient is a 24 y.o. year old female with history of above preop diagnosis.  I recommended colonoscopy with possible biopsy or polypectomy and I explained the risk, benefits, expected outcome, and alternatives to the procedure.  Risks included but are not limited to medication allergy, medication reaction, cardiovascular and respiratory problems, bleeding, perforation, infection, and/or missed diagnosis.  The patient understands and is in agreement.        PROCEDURE: Following arrival in the endoscopy room, the patient was placed in the left lateral decubitus position and final time-out accomplished in the presence of the nursing staff. Baseline vital signs were obtained and reviewed. The patient was given IV Monitored Anesthesia Care (MAC) and vitals

## 2024-10-30 NOTE — H&P
HISTORY and PHYSICAL  [facility]       NAME:  Azalea Meyers  MRN: [id number]   YOB: 2000   Date: 10/30/2024   Age: 24 y.o.  Gender: female       COMPLAINT AND PRESENT HISTORY:     Azalea Meyers is 24 y.o.,  female, presents for   ESOPHAGOGASTRODUODENOSCOPY BIOPSY, COLONOSCOPY DIAGNOSTIC     Primary dx: Gastroesophageal reflux disease, unspecified whether esophagitis present [K21.9]  Change in bowel habits [R19.4].    HPI:  Azalea Meyers is 24 y.o.,  female, will be having a Colonoscopy and EGD.  Prior Colonoscopy  and EGD was done 2020  Patient denies any  FH of Colon or esophogeal  Cancer.    Patient reports  changes in bowel habits. Pt has alternative between diarrhea and constipation with some GI /Rectal bleeding started 2022.   Patient has a history of  constant abd.  Sharp pain in the RUQ area started two years ago . With nausea and vomiting, abdominal bloating, she lost 80 pounds over last year. She had appendectomy done on 2/22/24.   Patient denies any Dysphagia.  Pt has hx of GERD  , currently no medication.   consistency of the stools. No fever or chills.  Prep fully completed: yes. Pt reports /her last BM is clear liquid     Review of additional significant medical hx:  (See chart for additional detail, including current medications /see ROS for current S/S):     NPO status: pt NPO since the past midnight   Medications taken TODAY (with sip of water): none  Anticoagulation status: none    Denies personal hx of blood clots.  Denies personal hx of MRSA infection.  Denies any personal or family hx of previous complications w/anesthesia.    PAST MEDICAL HISTORY     Past Medical History:   Diagnosis Date    Acid reflux disease     ADHD (attention deficit hyperactivity disorder)     Anxiety     Depression     Hx of urinary tract infections     Wears glasses        SURGICAL HISTORY       Past Surgical History:   Procedure Laterality Date    APPENDECTOMY  02/22/2024    Either the

## 2024-10-30 NOTE — OP NOTE
Esophagogastroduodenoscopy    DATE OF PROCEDURE: 10/30/2024     SURGEON: Atif Sharp MD  Facility: [facility]  ASSISTANT: None  Anesthesia: Monitored anesthesia care  PREOPERATIVE DIAGNOSIS: Anemia, dysphagia, GERD, chronic diarrhea    Diagnosis:    POSTOPERATIVE DIAGNOSIS: As described below (see findings and impression)    OPERATION: Upper GI endoscopy with Biopsy    ANESTHESIA: Monitored Anesthesia Care (MAC)     ESTIMATED BLOOD LOSS: Less than 50 ml    COMPLICATIONS: None.     SPECIMENS:  Was Obtained:     ID Type Source Tests Collected by Time Destination   A : DUODENUM BIOPSY Tissue Duodenum SURGICAL PATHOLOGY Atif Sharp MD 10/30/2024 1241    B : STOMACH BIOPSY Tissue Stomach SURGICAL PATHOLOGY Atif Sharp MD 10/30/2024 1241    C : GE JUNCTION BIOPSY Tissue GE Junction Biopsy SURGICAL PATHOLOGY Atif Sharp MD 10/30/2024 1243    D : ESOPHAGUS BIOPSY TO RULE OUT EOE Tissue Esophagus SURGICAL PATHOLOGY Atif Sharp MD 10/30/2024 1241         HISTORY: The patient is a 24 y.o. year old female with history of above preop diagnosis.  I recommended esophagogastroduodenoscopy with possible biopsy and I explained the risk, benefits, expected outcome, and alternatives to the procedure.  Risks included but are not limited to bleeding, infection, respiratory distress, hypotension, and perforation of the esophagus, stomach, or duodenum.  Patient understands and is in agreement.      PROCEDURE: The patient was given IV Monitored Anesthesia Care (MAC) and vitals monitoring per anesthesia department.  The patient was placed in the left lateral decubitus position. The patient was monitored continuously with ECG tracing, pulse oximetry, blood pressure monitoring, and direct observation. The gastroscope was inserted into the mouth and advanced under direct vision to second portion of the duodenum.  A careful inspection was made as the gastroscope was withdrawn, including a retroflexed view

## 2024-11-01 LAB — SURGICAL PATHOLOGY REPORT: NORMAL

## 2024-11-06 DIAGNOSIS — K21.9 GASTROESOPHAGEAL REFLUX DISEASE WITHOUT ESOPHAGITIS: ICD-10-CM

## 2024-11-06 DIAGNOSIS — R19.4 CHANGE IN BOWEL HABIT: ICD-10-CM

## 2024-11-06 DIAGNOSIS — R13.19 ESOPHAGEAL DYSPHAGIA: ICD-10-CM

## 2024-11-20 ENCOUNTER — CLINICAL DOCUMENTATION (OUTPATIENT)
Dept: PHYSICAL THERAPY | Facility: CLINIC | Age: 24
End: 2024-11-20

## 2024-11-20 NOTE — DISCHARGE SUMMARY
[] [facility]  Outpatient Rehabilitation &  Therapy  [address]  [phone]  [phone] [] [facility]  Outpatient Rehabilitation &  Therapy  [address]  P: [phone]  F: [phone] [x] [facility]  Outpatient Rehabilitation &  Therapy  [address]  P: [phone]  F: [phone] [] [facility]  Outpatient Rehabilitation &  Therapy  [address]  [phone]  [phone] [] [facility]  Outpatient Rehabilitation &  Therapy  [address]   P: [phone]  F: [phone]  [] [facility]  Outpatient Rehabilitation &  Therapy  [address]  P: [phone]  F: [phone] [] [facility]  Outpatient Rehabilitation &  Therapy  [address]  P: [phone]  F: [phone] [] [facility]  Outpatient Rehabilitation &  Therapy  [address]  P: [phone]  F: [phone] [] [facility]  Outpatient Rehabilitation &  Therapy  [address]  P: [phone]  F: [phone]  [] [facility] Outpatient Rehabilitation &  Therapy  [address]  P: [phone]  F: [phone]     Physical Therapy Discharge Note    Date: 2024      Patient: Azalea Meyers  : 2000  MRN: [id number]    Physician: WOLF Lyles                   Insurance:  [place] PPO: tracee  60 vs remain, hard max, combined; No auth req'd; OOP met - covered 100%    Medical Diagnosis:   Z01.419 (ICD-10-CM) - Well woman exam   R10.2 (ICD-10-CM) - Pelvic pain      Rehab Codes: R10.2, R27.8, M62.81, M62.08, R39.15, N39.41   Onset Date:  3/20/2022                       Next 's appt: 24  Visit# / total visits: ;                       Total visits attended: 20  Cancels/No shows: 0  Date of initial visit:

## 2024-12-04 NOTE — PROGRESS NOTES
Reason for Referral:     No referring provider defined for this encounter.    Chief Complaint   Patient presents with    Abdominal Pain     Upper area between ribs and lower R side  Feels like pressure/bloating  Happens more after eating  On and off since       Follow-up     F/u office visit    Pt advise she can feel the food going down and when it gets stuck     Constipation    Diarrhea    Nausea    Dysphagia    Gastroesophageal Reflux       1. Gastroesophageal reflux disease with esophagitis, unspecified whether hemorrhage    2. Irritable bowel syndrome with constipation    3. Abdominal pain, unspecified abdominal location    4. Esophageal dysphagia        HISTORY OF PRESENT ILLNESS: Ms.Kalyn MANSOOR Meyers is a 24 y.o. female with a past history remarkable for  recent appendectomy, GERD, IBS constipation referred post scope follow-up for GERD and IBS constipation    12/5/2024: No scope indicated that upper and lower GI tract was unremarkable other than mild esophagitis at the level of GE junction.  After starting omeprazole 20 daily and Metamucil she feels that her acid reflux is better although she has breakthrough at night and early in the morning when the effects of omeprazole 20 daily weans off.  Her symptoms include throat irritation and problems swallowing.  Stool calprotectin was also negative with normal CRP.  Due to cost she chose ClearLax instead of Metamucil.  She continues to have intermittent constipation with a small amount of diarrhea.  She continues to have abdominal cramps and pain that I explained that it is likely due to IBS due to fair prep in her recent colonoscopy     9/20/2024: patient has been having problem with GI symptoms since 2019.  After gallbladder removal in January 2020 her symptoms worsened.  She had appendectomy February 2024.  Her symptoms include issue with abdominal bloating, discomfort, intermittent constipation and diarrhea, and more than 80 pounds of weight loss in

## 2024-12-05 ENCOUNTER — OFFICE VISIT (OUTPATIENT)
Dept: GASTROENTEROLOGY | Age: 24
End: 2024-12-05
Payer: COMMERCIAL

## 2024-12-05 VITALS
SYSTOLIC BLOOD PRESSURE: 105 MMHG | TEMPERATURE: 97.3 F | HEART RATE: 78 BPM | DIASTOLIC BLOOD PRESSURE: 70 MMHG | HEIGHT: 63 IN | OXYGEN SATURATION: 100 % | WEIGHT: 120.6 LBS | BODY MASS INDEX: 21.37 KG/M2 | RESPIRATION RATE: 16 BRPM

## 2024-12-05 DIAGNOSIS — R13.19 ESOPHAGEAL DYSPHAGIA: ICD-10-CM

## 2024-12-05 DIAGNOSIS — K58.1 IRRITABLE BOWEL SYNDROME WITH CONSTIPATION: ICD-10-CM

## 2024-12-05 DIAGNOSIS — R10.9 ABDOMINAL PAIN, UNSPECIFIED ABDOMINAL LOCATION: ICD-10-CM

## 2024-12-05 DIAGNOSIS — K21.00 GASTROESOPHAGEAL REFLUX DISEASE WITH ESOPHAGITIS, UNSPECIFIED WHETHER HEMORRHAGE: Primary | ICD-10-CM

## 2024-12-05 PROCEDURE — 99214 OFFICE O/P EST MOD 30 MIN: CPT | Performed by: STUDENT IN AN ORGANIZED HEALTH CARE EDUCATION/TRAINING PROGRAM

## 2024-12-05 RX ORDER — FAMOTIDINE 20 MG/1
20 TABLET, FILM COATED ORAL NIGHTLY PRN
Qty: 30 TABLET | Refills: 1 | Status: SHIPPED | OUTPATIENT
Start: 2024-12-05

## 2024-12-10 ENCOUNTER — OFFICE VISIT (OUTPATIENT)
Dept: FAMILY MEDICINE CLINIC | Age: 24
End: 2024-12-10
Payer: COMMERCIAL

## 2024-12-10 VITALS
SYSTOLIC BLOOD PRESSURE: 104 MMHG | OXYGEN SATURATION: 100 % | HEIGHT: 63 IN | DIASTOLIC BLOOD PRESSURE: 68 MMHG | TEMPERATURE: 97.2 F | BODY MASS INDEX: 21.62 KG/M2 | RESPIRATION RATE: 16 BRPM | WEIGHT: 122 LBS | HEART RATE: 66 BPM

## 2024-12-10 DIAGNOSIS — M25.50 ARTHRALGIA, UNSPECIFIED JOINT: Primary | ICD-10-CM

## 2024-12-10 DIAGNOSIS — M79.10 MYALGIA: ICD-10-CM

## 2024-12-10 DIAGNOSIS — R53.83 FATIGUE, UNSPECIFIED TYPE: ICD-10-CM

## 2024-12-10 PROCEDURE — 99213 OFFICE O/P EST LOW 20 MIN: CPT | Performed by: NURSE PRACTITIONER

## 2024-12-10 RX ORDER — LISDEXAMFETAMINE DIMESYLATE 30 MG/1
30 CAPSULE ORAL DAILY
COMMUNITY
Start: 2024-12-09

## 2024-12-10 ASSESSMENT — ENCOUNTER SYMPTOMS
DIARRHEA: 0
SHORTNESS OF BREATH: 0
COUGH: 0
RHINORRHEA: 0
CONSTIPATION: 0
ABDOMINAL PAIN: 0
ABDOMINAL DISTENTION: 0
VOMITING: 0
SORE THROAT: 0
CHEST TIGHTNESS: 0
NAUSEA: 0
BACK PAIN: 0

## 2024-12-10 NOTE — PROGRESS NOTES
Osiris Kee, APRN-CNP  [facility]  [facility]  [address], [address]  [place] [address]  Dept: [phone]  Dept Fax: [phone]    Patient ID: Azalea Meyers is a 24 y.o. female.    HPI: Established pt here today for an acute visit secondary to ongoing pain and fatigue. She relates that she is still struggling with inflammation in her hips and back. We did do Prednisone, which helps. She has started to do some research and she thinks that she has fibromyalgia. She relates that she has skin hypersensitivity when being touched. She relates that her muscles are tense all of the time. She relates that there is numbness and tingling in her hands and feet. .  Pt denies any fever or chills.  Pt today denies any HA, chest pain, or SOB.  Pt denies any N/V/D/C or abdominal pain.  Otherwise pt doing well on current tx and voices no other concerns.     Previous office notes, labs and diagnostic studies were reviewed prior to and during encounter.  The patient's past medical, surgical, social, and family history as well as her current medications and allergies were reviewed as documented in today's encounter by AURY Cotter.       Current Outpatient Medications on File Prior to Visit   Medication Sig Dispense Refill    VYVANSE 30 MG capsule Take 1 capsule by mouth daily.      omeprazole (PRILOSEC) 20 MG delayed release capsule Take 1 capsule by mouth in the morning and at bedtime 90 capsule 1    famotidine (PEPCID) 20 MG tablet Take 1 tablet by mouth nightly as needed (gerd) 30 tablet 1    linaclotide (LINZESS) 145 MCG capsule Take 1 capsule by mouth every morning (before breakfast) 30 capsule 1    sertraline (ZOLOFT) 50 MG tablet Take 1.5 tablets by mouth daily      psyllium (KONSYL) 28.3 % POWD powder Take 3.4 g by mouth daily (Patient not taking: Reported on 12/10/2024) 538 g 0    sodium-potassium-mag sulfate (SUPREP) 17.5-3.13-1.6 GM/177ML SOLN solution

## 2025-02-13 ENCOUNTER — OFFICE VISIT (OUTPATIENT)
Dept: GASTROENTEROLOGY | Age: 25
End: 2025-02-13
Payer: COMMERCIAL

## 2025-02-13 VITALS
RESPIRATION RATE: 16 BRPM | HEIGHT: 63 IN | BODY MASS INDEX: 21.9 KG/M2 | HEART RATE: 78 BPM | OXYGEN SATURATION: 100 % | DIASTOLIC BLOOD PRESSURE: 59 MMHG | WEIGHT: 123.6 LBS | SYSTOLIC BLOOD PRESSURE: 102 MMHG

## 2025-02-13 DIAGNOSIS — K59.04 CHRONIC IDIOPATHIC CONSTIPATION: Primary | ICD-10-CM

## 2025-02-13 DIAGNOSIS — R19.7 DIARRHEA, UNSPECIFIED TYPE: ICD-10-CM

## 2025-02-13 DIAGNOSIS — K58.2 IRRITABLE BOWEL SYNDROME WITH MIXED BOWEL HABITS: ICD-10-CM

## 2025-02-13 DIAGNOSIS — R10.9 ABDOMINAL PAIN, UNSPECIFIED ABDOMINAL LOCATION: ICD-10-CM

## 2025-02-13 DIAGNOSIS — K30 FUNCTIONAL DYSPEPSIA: ICD-10-CM

## 2025-02-13 PROCEDURE — 99214 OFFICE O/P EST MOD 30 MIN: CPT | Performed by: STUDENT IN AN ORGANIZED HEALTH CARE EDUCATION/TRAINING PROGRAM

## 2025-02-13 RX ORDER — AMITRIPTYLINE HYDROCHLORIDE 10 MG/1
10 TABLET ORAL NIGHTLY
Qty: 90 TABLET | Refills: 0 | Status: SHIPPED | OUTPATIENT
Start: 2025-02-13

## 2025-02-13 RX ORDER — DICYCLOMINE HYDROCHLORIDE 10 MG/1
10 CAPSULE ORAL 3 TIMES DAILY PRN
Qty: 30 CAPSULE | Refills: 2 | Status: SHIPPED | OUTPATIENT
Start: 2025-02-13

## 2025-02-13 RX ORDER — CHOLESTYRAMINE 4 G/9G
1 POWDER, FOR SUSPENSION ORAL 2 TIMES DAILY
Qty: 10 PACKET | Refills: 0 | Status: SHIPPED | OUTPATIENT
Start: 2025-02-13

## 2025-02-13 RX ORDER — LUBIPROSTONE 24 UG/1
24 CAPSULE ORAL 2 TIMES DAILY WITH MEALS
Qty: 60 CAPSULE | Refills: 3 | Status: SHIPPED | OUTPATIENT
Start: 2025-02-13

## 2025-02-13 NOTE — PROGRESS NOTES
sodium-potassium-mag sulfate (SUPREP) 17.5-3.13-1.6 GM/177ML SOLN solution, Please follow instructions given to you by your provider., Disp: 177 mL, Rfl: 0    ALLERGIES:   No Known Allergies    FAMILY HISTORY:       Problem Relation Age of Onset    Heart Surgery Maternal Grandfather     Diabetes Paternal Grandmother        SOCIAL HISTORY:   Social History     Socioeconomic History    Marital status:      Spouse name: Not on file    Number of children: Not on file    Years of education: Not on file    Highest education level: Not on file   Occupational History    Not on file   Tobacco Use    Smoking status: Every Day     Types: E-Cigarettes     Passive exposure: Current    Smokeless tobacco: Never   Vaping Use    Vaping status: Every Day    Substances: Nicotine   Substance and Sexual Activity    Alcohol use: Never    Drug use: Yes     Frequency: 2.0 times per week     Types: Marijuana (Weed)    Sexual activity: Yes     Partners: Male   Other Topics Concern    Not on file   Social History Narrative    Not on file     Social Determinants of Health     Financial Resource Strain: Low Risk  (5/31/2024)    Overall Financial Resource Strain (CARDIA)     Difficulty of Paying Living Expenses: Not hard at all   Food Insecurity: No Food Insecurity (5/31/2024)    Hunger Vital Sign     Worried About Running Out of Food in the Last Year: Never true     Ran Out of Food in the Last Year: Never true   Transportation Needs: Unknown (5/31/2024)    PRAPARE - Transportation     Lack of Transportation (Medical): Not on file     Lack of Transportation (Non-Medical): No   Physical Activity: Unknown (6/22/2024)    Exercise Vital Sign     Days of Exercise per Week: 7 days     Minutes of Exercise per Session: Not on file   Stress: Not on file   Social Connections: Moderately Isolated (1/28/2021)    Received from [facility] [employer] [facility]    Social Connection and Isolation Panel [NHANES]     Frequency of

## 2025-04-07 ENCOUNTER — HOSPITAL ENCOUNTER (OUTPATIENT)
Age: 25
Discharge: HOME | End: 2025-04-07
Payer: COMMERCIAL

## 2025-04-07 LAB
ERYTHROCYTE [SEDIMENTATION RATE] IN BLOOD BY PHOTOMETRIC METHOD: 3 MM/HR (ref 0–20)
RHEUMATOID FACT SER NEPH-ACNC: <10 IU/ML (ref 0–13)

## 2025-04-07 PROCEDURE — 86225 DNA ANTIBODY NATIVE: CPT

## 2025-04-07 PROCEDURE — 85652 RBC SED RATE AUTOMATED: CPT

## 2025-04-07 PROCEDURE — 36415 COLL VENOUS BLD VENIPUNCTURE: CPT

## 2025-04-07 PROCEDURE — 86038 ANTINUCLEAR ANTIBODIES: CPT

## 2025-04-07 PROCEDURE — 86431 RHEUMATOID FACTOR QUANT: CPT

## 2025-04-07 PROCEDURE — 86235 NUCLEAR ANTIGEN ANTIBODY: CPT

## 2025-04-08 LAB
ANA SER QL IA: NEGATIVE
DSDNA IGG SER QL IA: 1.5 IU/ML
ENA SS-A IGG SER QL: 0.4 U/ML
ENA SS-B IGG SER IA-ACNC: 0.5 U/ML
NUCLEAR IGG SER IA-RTO: 0.3 U/ML

## 2025-04-14 DIAGNOSIS — K21.00 GASTROESOPHAGEAL REFLUX DISEASE WITH ESOPHAGITIS, UNSPECIFIED WHETHER HEMORRHAGE: ICD-10-CM

## 2025-04-14 RX ORDER — FAMOTIDINE 20 MG/1
TABLET, FILM COATED ORAL
Qty: 30 TABLET | Refills: 0 | Status: SHIPPED | OUTPATIENT
Start: 2025-04-14

## 2025-05-01 ENCOUNTER — OFFICE VISIT (OUTPATIENT)
Dept: GASTROENTEROLOGY | Age: 25
End: 2025-05-01
Payer: COMMERCIAL

## 2025-05-01 VITALS
BODY MASS INDEX: 21.79 KG/M2 | OXYGEN SATURATION: 99 % | WEIGHT: 123 LBS | HEART RATE: 92 BPM | DIASTOLIC BLOOD PRESSURE: 76 MMHG | TEMPERATURE: 98.6 F | HEIGHT: 63 IN | SYSTOLIC BLOOD PRESSURE: 116 MMHG

## 2025-05-01 DIAGNOSIS — K64.8 HEMORRHOIDS, INTERNAL, WITH BLEEDING: Primary | ICD-10-CM

## 2025-05-01 DIAGNOSIS — K58.2 IRRITABLE BOWEL SYNDROME WITH MIXED BOWEL HABITS: ICD-10-CM

## 2025-05-01 DIAGNOSIS — K30 FUNCTIONAL DYSPEPSIA: ICD-10-CM

## 2025-05-01 DIAGNOSIS — K62.5 BRBPR (BRIGHT RED BLOOD PER RECTUM): ICD-10-CM

## 2025-05-01 PROCEDURE — 99214 OFFICE O/P EST MOD 30 MIN: CPT | Performed by: STUDENT IN AN ORGANIZED HEALTH CARE EDUCATION/TRAINING PROGRAM

## 2025-05-01 RX ORDER — ARIPIPRAZOLE 2 MG/1
2 TABLET ORAL DAILY
COMMUNITY
Start: 2025-04-02

## 2025-05-01 RX ORDER — SENNA AND DOCUSATE SODIUM 50; 8.6 MG/1; MG/1
1 TABLET, FILM COATED ORAL DAILY
Qty: 30 TABLET | Refills: 0 | Status: CANCELLED | OUTPATIENT
Start: 2025-05-01

## 2025-05-01 RX ORDER — HYDROCORTISONE ACETATE 25 MG/1
25 SUPPOSITORY RECTAL EVERY 12 HOURS
Qty: 28 SUPPOSITORY | Refills: 0 | Status: SHIPPED | OUTPATIENT
Start: 2025-05-01

## 2025-05-01 NOTE — PROGRESS NOTES
Reason for Referral:     No referring provider defined for this encounter.    Chief Complaint   Patient presents with    Follow-up    Other     medications       1. Hemorrhoids, internal, with bleeding    2. Functional dyspepsia    3. Irritable bowel syndrome with mixed bowel habits    4. BRBPR (bright red blood per rectum)        HISTORY OF PRESENT ILLNESS: Ms.Kalyn MANSOOR Meyers is a 25 y.o. female with a past history remarkable for appendectomy, GERD, IBS constipation follows up for BRBPR, upper abdominal pain, IBS mixed    5/1/2025: Lubiprostone helps with lower abdominal pain and her bowel movements she is taking at night.  She is taking Metamucil daily too. Bm less constipatin and diarrhea. The hemorrhoids are problematic. Sometimes bleeding, itching.  She has tried over-the-counter hemorrhoidal cream with no help.  She is taking omeprazole twice daily and Pepcid before bed.  She denies nausea, vomiting, upper abdominal pain, bloating.  She has not required Questran for diarrhea for a while.     2/13/25: After omeprazole BID with pepcid before bed she feels her sleep is better but still during daytime she has episodes of upper abdominal pain with choking sensation.  Her lower abdominal pain is much better thanks to Linzess.  She feels that she gets a lower abdominal pain when she has bad constipation or when she has episodes of bile diarrhea.  Unfortunately cost is a limiting factor for Linzess 145 and she would like another medication.  She reports she has lots of anxiety and stress in life and symptoms are related.  She has tried FODMAP for 2 months that partially helps     12/5/2024: upper and lower GI tract was unremarkable other than mild esophagitis at the level of GE junction.  After starting omeprazole 20 daily and Metamucil she feels that her acid reflux is better although she has breakthrough at night and early in the morning when the effects of omeprazole 20 daily weans off.  Her symptoms include

## 2025-05-30 DIAGNOSIS — K30 FUNCTIONAL DYSPEPSIA: ICD-10-CM

## 2025-05-30 RX ORDER — AMITRIPTYLINE HYDROCHLORIDE 10 MG/1
10 TABLET ORAL NIGHTLY
Qty: 90 TABLET | Refills: 0 | Status: SHIPPED | OUTPATIENT
Start: 2025-05-30

## 2025-06-11 DIAGNOSIS — K21.00 GASTROESOPHAGEAL REFLUX DISEASE WITH ESOPHAGITIS, UNSPECIFIED WHETHER HEMORRHAGE: ICD-10-CM

## 2025-06-11 RX ORDER — FAMOTIDINE 20 MG/1
TABLET, FILM COATED ORAL
Qty: 30 TABLET | Refills: 0 | Status: SHIPPED | OUTPATIENT
Start: 2025-06-11

## 2025-07-17 DIAGNOSIS — K21.00 GASTROESOPHAGEAL REFLUX DISEASE WITH ESOPHAGITIS, UNSPECIFIED WHETHER HEMORRHAGE: ICD-10-CM

## 2025-07-17 RX ORDER — OMEPRAZOLE 20 MG/1
CAPSULE, DELAYED RELEASE ORAL
Qty: 90 CAPSULE | Refills: 0 | Status: SHIPPED | OUTPATIENT
Start: 2025-07-17

## 2025-08-21 RX ORDER — METHYLPHENIDATE HYDROCHLORIDE 54 MG/1
54 TABLET ORAL DAILY
COMMUNITY
Start: 2025-07-01

## 2025-08-21 RX ORDER — AMMONIUM LACTATE 12 G/100G
CREAM TOPICAL
COMMUNITY
Start: 2025-08-13

## 2025-08-21 RX ORDER — METHYLPHENIDATE HYDROCHLORIDE 18 MG/1
18 TABLET ORAL DAILY
COMMUNITY
Start: 2025-05-14

## 2025-08-21 RX ORDER — CLINDAMYCIN PHOSPHATE 1 G/10ML
GEL TOPICAL
COMMUNITY
Start: 2025-06-09

## 2025-08-21 RX ORDER — TRETINOIN 0.5 MG/G
CREAM TOPICAL
COMMUNITY
Start: 2025-06-09

## 2025-08-21 RX ORDER — CLINDAMYCIN PHOSPHATE 10 MG/G
GEL TOPICAL
COMMUNITY
Start: 2025-07-16

## 2025-08-21 RX ORDER — METHYLPHENIDATE HYDROCHLORIDE 18 MG/1
1 TABLET, EXTENDED RELEASE ORAL
COMMUNITY

## 2025-08-21 RX ORDER — ERGOCALCIFEROL 1.25 MG/1
CAPSULE ORAL
COMMUNITY
Start: 2025-06-02

## 2025-08-21 RX ORDER — ERYTHROMYCIN 20 MG/G
GEL TOPICAL
COMMUNITY
Start: 2025-06-18

## 2025-08-25 ENCOUNTER — OFFICE VISIT (OUTPATIENT)
Dept: GASTROENTEROLOGY | Age: 25
End: 2025-08-25
Payer: COMMERCIAL

## 2025-08-25 VITALS
SYSTOLIC BLOOD PRESSURE: 92 MMHG | HEIGHT: 63 IN | WEIGHT: 145 LBS | HEART RATE: 78 BPM | BODY MASS INDEX: 25.69 KG/M2 | OXYGEN SATURATION: 98 % | DIASTOLIC BLOOD PRESSURE: 56 MMHG

## 2025-08-25 DIAGNOSIS — K21.00 GASTROESOPHAGEAL REFLUX DISEASE WITH ESOPHAGITIS, UNSPECIFIED WHETHER HEMORRHAGE: ICD-10-CM

## 2025-08-25 DIAGNOSIS — K58.2 IRRITABLE BOWEL SYNDROME WITH MIXED BOWEL HABITS: Primary | ICD-10-CM

## 2025-08-25 PROCEDURE — 99214 OFFICE O/P EST MOD 30 MIN: CPT | Performed by: STUDENT IN AN ORGANIZED HEALTH CARE EDUCATION/TRAINING PROGRAM

## 2025-09-02 DIAGNOSIS — K21.00 GASTROESOPHAGEAL REFLUX DISEASE WITH ESOPHAGITIS, UNSPECIFIED WHETHER HEMORRHAGE: ICD-10-CM

## 2025-09-02 RX ORDER — FAMOTIDINE 20 MG/1
TABLET, FILM COATED ORAL
Qty: 30 TABLET | Refills: 0 | Status: SHIPPED | OUTPATIENT
Start: 2025-09-02

## (undated) DEVICE — DEFENDO AIR WATER SUCTION AND BIOPSY VALVE KIT FOR  OLYMPUS: Brand: DEFENDO AIR/WATER/SUCTION AND BIOPSY VALVE

## (undated) DEVICE — ENDO KIT W/SYRINGE: Brand: MEDLINE INDUSTRIES, INC.

## (undated) DEVICE — BITEBLOCK 54FR W/ DENT RIM BLOX

## (undated) DEVICE — FORCEPS BX L240CM WRK CHN 2.8MM STD CAP W/ NDL MIC MESH

## (undated) DEVICE — GLOVE ORANGE PI 8   MSG9080